# Patient Record
Sex: FEMALE | Race: WHITE | NOT HISPANIC OR LATINO | Employment: UNEMPLOYED | URBAN - METROPOLITAN AREA
[De-identification: names, ages, dates, MRNs, and addresses within clinical notes are randomized per-mention and may not be internally consistent; named-entity substitution may affect disease eponyms.]

---

## 2017-01-16 DIAGNOSIS — Z01.812 PRE-PROCEDURAL LABORATORY EXAMINATION: ICD-10-CM

## 2017-01-16 LAB
ANION GAP SERPL CALC-SCNC: 8 MMOL/L (ref 0–11.9)
BASOPHILS # BLD AUTO: 0.6 % (ref 0–1.8)
BASOPHILS # BLD: 0.04 K/UL (ref 0–0.12)
BUN SERPL-MCNC: 19 MG/DL (ref 8–22)
CALCIUM SERPL-MCNC: 9.7 MG/DL (ref 8.5–10.5)
CHLORIDE SERPL-SCNC: 102 MMOL/L (ref 96–112)
CO2 SERPL-SCNC: 25 MMOL/L (ref 20–33)
CREAT SERPL-MCNC: 0.7 MG/DL (ref 0.5–1.4)
EOSINOPHIL # BLD AUTO: 0.09 K/UL (ref 0–0.51)
EOSINOPHIL NFR BLD: 1.4 % (ref 0–6.9)
ERYTHROCYTE [DISTWIDTH] IN BLOOD BY AUTOMATED COUNT: 39.8 FL (ref 35.9–50)
GFR SERPL CREATININE-BSD FRML MDRD: >60 ML/MIN/1.73 M 2
GLUCOSE SERPL-MCNC: 105 MG/DL (ref 65–99)
HCT VFR BLD AUTO: 42 % (ref 37–47)
HGB BLD-MCNC: 14.6 G/DL (ref 12–16)
IMM GRANULOCYTES # BLD AUTO: 0.02 K/UL (ref 0–0.11)
IMM GRANULOCYTES NFR BLD AUTO: 0.3 % (ref 0–0.9)
LYMPHOCYTES # BLD AUTO: 1.99 K/UL (ref 1–4.8)
LYMPHOCYTES NFR BLD: 30.6 % (ref 22–41)
MCH RBC QN AUTO: 31.1 PG (ref 27–33)
MCHC RBC AUTO-ENTMCNC: 34.8 G/DL (ref 33.6–35)
MCV RBC AUTO: 89.4 FL (ref 81.4–97.8)
MONOCYTES # BLD AUTO: 0.63 K/UL (ref 0–0.85)
MONOCYTES NFR BLD AUTO: 9.7 % (ref 0–13.4)
NEUTROPHILS # BLD AUTO: 3.74 K/UL (ref 2–7.15)
NEUTROPHILS NFR BLD: 57.4 % (ref 44–72)
NRBC # BLD AUTO: 0 K/UL
NRBC BLD AUTO-RTO: 0 /100 WBC
PLATELET # BLD AUTO: 263 K/UL (ref 164–446)
PMV BLD AUTO: 9.3 FL (ref 9–12.9)
POTASSIUM SERPL-SCNC: 3.7 MMOL/L (ref 3.6–5.5)
RBC # BLD AUTO: 4.7 M/UL (ref 4.2–5.4)
SODIUM SERPL-SCNC: 135 MMOL/L (ref 135–145)
WBC # BLD AUTO: 6.5 K/UL (ref 4.8–10.8)

## 2017-01-16 PROCEDURE — 85025 COMPLETE CBC W/AUTO DIFF WBC: CPT

## 2017-01-16 PROCEDURE — 80048 BASIC METABOLIC PNL TOTAL CA: CPT

## 2017-01-16 PROCEDURE — 36415 COLL VENOUS BLD VENIPUNCTURE: CPT

## 2017-01-21 ENCOUNTER — HOSPITAL ENCOUNTER (OUTPATIENT)
Facility: MEDICAL CENTER | Age: 27
End: 2017-01-22
Attending: SPECIALIST | Admitting: SPECIALIST
Payer: MEDICAID

## 2017-01-21 PROBLEM — N94.10 FEMALE DYSPAREUNIA: Status: ACTIVE | Noted: 2017-01-21

## 2017-01-21 PROBLEM — N87.1 MODERATE DYSPLASIA OF CERVIX: Status: ACTIVE | Noted: 2017-01-21

## 2017-01-21 LAB
HCG UR QL: NEGATIVE
SP GR UR REFRACTOMETRY: 1.02

## 2017-01-21 PROCEDURE — 502703 HCHG DEVICE, LIGASURE V SEALER: Performed by: SPECIALIST

## 2017-01-21 PROCEDURE — A4314 CATH W/DRAINAGE 2-WAY LATEX: HCPCS | Performed by: SPECIALIST

## 2017-01-21 PROCEDURE — 81025 URINE PREGNANCY TEST: CPT

## 2017-01-21 PROCEDURE — 501838 HCHG SUTURE GENERAL: Performed by: SPECIALIST

## 2017-01-21 PROCEDURE — 502240 HCHG MISC OR SUPPLY RC 0272: Performed by: SPECIALIST

## 2017-01-21 PROCEDURE — A9270 NON-COVERED ITEM OR SERVICE: HCPCS | Performed by: SPECIALIST

## 2017-01-21 PROCEDURE — 500886 HCHG PACK, LAPAROSCOPY: Performed by: SPECIALIST

## 2017-01-21 PROCEDURE — 700111 HCHG RX REV CODE 636 W/ 250 OVERRIDE (IP)

## 2017-01-21 PROCEDURE — A4606 OXYGEN PROBE USED W OXIMETER: HCPCS | Performed by: SPECIALIST

## 2017-01-21 PROCEDURE — 700102 HCHG RX REV CODE 250 W/ 637 OVERRIDE(OP)

## 2017-01-21 PROCEDURE — 500800 HCHG LAPAROSCOPIC J/L HOOK: Performed by: SPECIALIST

## 2017-01-21 PROCEDURE — 160039 HCHG SURGERY MINUTES - EA ADDL 1 MIN LEVEL 3: Performed by: SPECIALIST

## 2017-01-21 PROCEDURE — 502594 HCHG SCISSOR HANDLE, HARMONIC ACE: Performed by: SPECIALIST

## 2017-01-21 PROCEDURE — 160036 HCHG PACU - EA ADDL 30 MINS PHASE I: Performed by: SPECIALIST

## 2017-01-21 PROCEDURE — 500123 HCHG BOVIE, CONTROL W/BLADE: Performed by: SPECIALIST

## 2017-01-21 PROCEDURE — 110371 HCHG SHELL REV 272: Performed by: SPECIALIST

## 2017-01-21 PROCEDURE — 160035 HCHG PACU - 1ST 60 MINS PHASE I: Performed by: SPECIALIST

## 2017-01-21 PROCEDURE — 501577 HCHG TROCAR, STEP 11MM: Performed by: SPECIALIST

## 2017-01-21 PROCEDURE — 96374 THER/PROPH/DIAG INJ IV PUSH: CPT | Mod: XU

## 2017-01-21 PROCEDURE — 96376 TX/PRO/DX INJ SAME DRUG ADON: CPT | Mod: XU

## 2017-01-21 PROCEDURE — 700102 HCHG RX REV CODE 250 W/ 637 OVERRIDE(OP): Performed by: SPECIALIST

## 2017-01-21 PROCEDURE — 160048 HCHG OR STATISTICAL LEVEL 1-5: Performed by: SPECIALIST

## 2017-01-21 PROCEDURE — 160009 HCHG ANES TIME/MIN: Performed by: SPECIALIST

## 2017-01-21 PROCEDURE — A9270 NON-COVERED ITEM OR SERVICE: HCPCS

## 2017-01-21 PROCEDURE — 500854 HCHG NEEDLE, INSUFFLATION FOR STEP: Performed by: SPECIALIST

## 2017-01-21 PROCEDURE — G0378 HOSPITAL OBSERVATION PER HR: HCPCS

## 2017-01-21 PROCEDURE — 502679 HCHG MANIPULATOR, UTRN DAVINCI: Performed by: SPECIALIST

## 2017-01-21 PROCEDURE — 700111 HCHG RX REV CODE 636 W/ 250 OVERRIDE (IP): Performed by: SPECIALIST

## 2017-01-21 PROCEDURE — 110382 HCHG SHELL REV 271: Performed by: SPECIALIST

## 2017-01-21 PROCEDURE — 88309 TISSUE EXAM BY PATHOLOGIST: CPT

## 2017-01-21 PROCEDURE — 160028 HCHG SURGERY MINUTES - 1ST 30 MINS LEVEL 3: Performed by: SPECIALIST

## 2017-01-21 PROCEDURE — 160002 HCHG RECOVERY MINUTES (STAT): Performed by: SPECIALIST

## 2017-01-21 PROCEDURE — 700101 HCHG RX REV CODE 250

## 2017-01-21 PROCEDURE — 501330 HCHG SET, CYSTO IRRIG TUBING: Performed by: SPECIALIST

## 2017-01-21 RX ORDER — SODIUM CHLORIDE, SODIUM LACTATE, POTASSIUM CHLORIDE, CALCIUM CHLORIDE 600; 310; 30; 20 MG/100ML; MG/100ML; MG/100ML; MG/100ML
INJECTION, SOLUTION INTRAVENOUS CONTINUOUS
Status: DISCONTINUED | OUTPATIENT
Start: 2017-01-21 | End: 2017-01-22 | Stop reason: HOSPADM

## 2017-01-21 RX ORDER — OXYCODONE HCL 5 MG/5 ML
SOLUTION, ORAL ORAL
Status: COMPLETED
Start: 2017-01-21 | End: 2017-01-21

## 2017-01-21 RX ORDER — MORPHINE SULFATE 10 MG/ML
5 INJECTION, SOLUTION INTRAMUSCULAR; INTRAVENOUS
Status: DISCONTINUED | OUTPATIENT
Start: 2017-01-21 | End: 2017-01-22 | Stop reason: HOSPADM

## 2017-01-21 RX ORDER — ZOLPIDEM TARTRATE 5 MG/1
5 TABLET ORAL NIGHTLY PRN
Status: DISCONTINUED | OUTPATIENT
Start: 2017-01-21 | End: 2017-01-22 | Stop reason: HOSPADM

## 2017-01-21 RX ORDER — OXYCODONE HCL 10 MG/1
TABLET, FILM COATED, EXTENDED RELEASE ORAL
Status: COMPLETED
Start: 2017-01-21 | End: 2017-01-21

## 2017-01-21 RX ORDER — IBUPROFEN 800 MG/1
800 TABLET ORAL EVERY 6 HOURS PRN
Status: DISCONTINUED | OUTPATIENT
Start: 2017-01-21 | End: 2017-01-22 | Stop reason: HOSPADM

## 2017-01-21 RX ORDER — ONDANSETRON 2 MG/ML
4 INJECTION INTRAMUSCULAR; INTRAVENOUS EVERY 6 HOURS PRN
Status: DISCONTINUED | OUTPATIENT
Start: 2017-01-21 | End: 2017-01-22 | Stop reason: HOSPADM

## 2017-01-21 RX ORDER — OXYCODONE AND ACETAMINOPHEN 7.5; 325 MG/1; MG/1
1 TABLET ORAL
Status: DISCONTINUED | OUTPATIENT
Start: 2017-01-21 | End: 2017-01-22

## 2017-01-21 RX ORDER — ACETAMINOPHEN 325 MG/1
650 TABLET ORAL ONCE
Status: DISCONTINUED | OUTPATIENT
Start: 2017-01-21 | End: 2017-01-22 | Stop reason: HOSPADM

## 2017-01-21 RX ORDER — OXYCODONE AND ACETAMINOPHEN 7.5; 325 MG/1; MG/1
1 TABLET ORAL EVERY 4 HOURS PRN
Status: DISCONTINUED | OUTPATIENT
Start: 2017-01-21 | End: 2017-01-21

## 2017-01-21 RX ORDER — SODIUM CHLORIDE, SODIUM LACTATE, POTASSIUM CHLORIDE, CALCIUM CHLORIDE 600; 310; 30; 20 MG/100ML; MG/100ML; MG/100ML; MG/100ML
1000 INJECTION, SOLUTION INTRAVENOUS
Status: DISCONTINUED | OUTPATIENT
Start: 2017-01-21 | End: 2017-01-22 | Stop reason: HOSPADM

## 2017-01-21 RX ORDER — SIMETHICONE 80 MG
80 TABLET,CHEWABLE ORAL EVERY 8 HOURS PRN
Status: DISCONTINUED | OUTPATIENT
Start: 2017-01-21 | End: 2017-01-22 | Stop reason: HOSPADM

## 2017-01-21 RX ADMIN — HYDROMORPHONE HYDROCHLORIDE 0.5 MG: 1 INJECTION, SOLUTION INTRAMUSCULAR; INTRAVENOUS; SUBCUTANEOUS at 15:00

## 2017-01-21 RX ADMIN — OXYCODONE HYDROCHLORIDE 10 MG: 5 SOLUTION ORAL at 14:00

## 2017-01-21 RX ADMIN — FENTANYL CITRATE 50 MCG: 50 INJECTION, SOLUTION INTRAMUSCULAR; INTRAVENOUS at 14:00

## 2017-01-21 RX ADMIN — HYDROMORPHONE HYDROCHLORIDE 0.3 MG: 1 INJECTION, SOLUTION INTRAMUSCULAR; INTRAVENOUS; SUBCUTANEOUS at 14:35

## 2017-01-21 RX ADMIN — MORPHINE SULFATE 5 MG: 10 INJECTION INTRAVENOUS at 19:06

## 2017-01-21 RX ADMIN — MORPHINE SULFATE 5 MG: 10 INJECTION INTRAVENOUS at 22:11

## 2017-01-21 RX ADMIN — FENTANYL CITRATE 50 MCG: 50 INJECTION, SOLUTION INTRAMUSCULAR; INTRAVENOUS at 14:10

## 2017-01-21 RX ADMIN — OXYCODONE AND ACETAMINOPHEN 1 TABLET: 7.5; 325 TABLET ORAL at 20:44

## 2017-01-21 RX ADMIN — OXYCODONE AND ACETAMINOPHEN 1 TABLET: 7.5; 325 TABLET ORAL at 16:06

## 2017-01-21 RX ADMIN — OXYCODONE AND ACETAMINOPHEN 1 TABLET: 7.5; 325 TABLET ORAL at 23:49

## 2017-01-21 RX ADMIN — IBUPROFEN 800 MG: 800 TABLET, FILM COATED ORAL at 16:06

## 2017-01-21 ASSESSMENT — PAIN SCALES - GENERAL
PAINLEVEL_OUTOF10: 7
PAINLEVEL_OUTOF10: 6
PAINLEVEL_OUTOF10: 5
PAINLEVEL_OUTOF10: 7
PAINLEVEL_OUTOF10: 6
PAINLEVEL_OUTOF10: 5
PAINLEVEL_OUTOF10: 5
PAINLEVEL_OUTOF10: 6

## 2017-01-21 ASSESSMENT — LIFESTYLE VARIABLES
HAVE YOU EVER FELT YOU SHOULD CUT DOWN ON YOUR DRINKING: NO
HOW MANY TIMES IN THE PAST YEAR HAVE YOU HAD 5 OR MORE DRINKS IN A DAY: 0
AVERAGE NUMBER OF DAYS PER WEEK YOU HAVE A DRINK CONTAINING ALCOHOL: 4
ALCOHOL_USE: YES
ON A TYPICAL DAY WHEN YOU DRINK ALCOHOL HOW MANY DRINKS DO YOU HAVE: 3
TOTAL SCORE: 0
TOTAL SCORE: 0
EVER FELT BAD OR GUILTY ABOUT YOUR DRINKING: NO
EVER_SMOKED: YES
EVER HAD A DRINK FIRST THING IN THE MORNING TO STEADY YOUR NERVES TO GET RID OF A HANGOVER: NO
CONSUMPTION TOTAL: POSITIVE
HAVE PEOPLE ANNOYED YOU BY CRITICIZING YOUR DRINKING: NO
TOTAL SCORE: 0

## 2017-01-21 NOTE — IP AVS SNAPSHOT
" <p align=\"LEFT\"><IMG SRC=\"//[email or web address]$/[email or web address]\" alt=\"Image\" WIDTH=\"50%\" HEIGHT=\"200\" BORDER=\"\"></p>                   Name:Mary Anne Valenzuela  Medical Record Number:[id number]  CSN: [id number]    YOB: 1990   Age: 26 y.o.  Sex: female  HT:1.702 m (5' 7\") WT: 80.1 kg (176 lb 9.4 oz)          Admit Date: 1/21/2017     Discharge Date:   Today's Date: 1/22/2017  Attending Doctor:  Santo Walsh M.D.                  Allergies:  Nkda          Follow-up Information     1. Follow up with Santo Walsh M.D.. Schedule an appointment as soon as possible for a visit in 1 week.    Specialty:  OB/Gyn    Contact information    [address] [place] [address] [address]  [facility] [address]  [phone]           Medication List      Take these Medications        Instructions    * ibuprofen 200 MG Tabs   Commonly known as:  MOTRIN    Take 400 mg by mouth every 6 hours as needed for Headache.   Dose:  400 mg       * ibuprofen 800 MG Tabs   Commonly known as:  MOTRIN    Take 1 Tab by mouth every 8 hours as needed.   Dose:  800 mg       * oxycodone-acetaminophen 5-325 MG Tabs   What changed:  Another medication with the same name was added. Make sure you understand how and when to take each.   Commonly known as:  PERCOCET    Take 1-2 Tabs by mouth every 3 hours as needed for Moderate Pain ((Pain Scale 4-6)).   Dose:  1-2 Tab       * oxycodone-acetaminophen  MG Tabs   What changed:  You were already taking a medication with the same name, and this prescription was added. Make sure you understand how and when to take each.   Commonly known as:  PERCOCET-10    Take 1-2 Tabs by mouth every four hours as needed for Moderate Pain.   Dose:  1-2 Tab       * Notice:  This list has 4 medication(s) that are the same as other medications prescribed for you. Read the directions carefully, and ask your doctor or other care provider to review them with you.      "

## 2017-01-21 NOTE — OP REPORT
DATE OF SERVICE:  01/21/2017    PREOPERATIVE DIAGNOSES:  1.  Recurrent cervical dysplasia.  2.  Pelvic pain.  3.  Dyspareunia.    POSTOPERATIVE DIAGNOSES:  1.  Recurrent cervical dysplasia.  2.  Pelvic pain.  3.  Dyspareunia.  4.  Intraperitoneal adhesions including an adhesion of the omentum to the   midline of the anterior abdominal wall just inferior to the umbilicus and also   an adhesion of the surface of the right ovary to the right side of the pelvis   limiting mobility of the right ovary.    PROCEDURES:  1.  Total laparoscopic hysterectomy.  2.  Laparoscopic lysis of adhesions including lysis of adhesions of the   omentum to the anterior abdominal wall and lysis of adhesions involving the   right ovary.    SURGEON:  Santo Walsh M.D.    ASSISTANT:  Roxana Contreras MD    ANESTHESIA:  General endotracheal tube anesthesia.    ANESTHESIOLOGIST:  Alejandro Gamino MD    FINDINGS:  At laparoscopy, both fallopian tubes are found to be absent.  There   is an adhesion attaching the right ovary to the right side of the pelvis   limiting mobility of the right ovary.  There was also an adhesion of the   omentum to the midline of the anterior abdominal wall just inferior to the   umbilicus.  Other than for the adhesions involving the right ovary, both   ovaries appeared to be normal and both ovarian fossa appeared to be normal.    At cystoscopy, bilateral ureteral jets of urine are seen indicating that both   ureters are patent.  Also, at cystoscopy, the dome of the bladder is seen and   appears normal.    SPECIMENS:  Uterus.    COMPLICATIONS:  None.    ESTIMATED BLOOD LOSS:  Approximately 200 mL.    DESCRIPTION OF PROCEDURE:  After the appropriate consents have been obtained,   the patient was taken to the operating room and given general anesthesia.  She   is prepped and draped in the dorsal lithotomy position and a Azar catheter   is noted to be in place and draining urine.  A speculum exam is performed and    reveals that there are no vulvar, vaginal or cervical lesions.  The cervix is   large and multiparous.  A single tooth tenaculum was applied to the anterior   aspect of the cervix.  The cervix is dilated with Hanks dilators.  A Vicryl   suture was placed in the anterior aspect of the cervix.  A second Vicryl   suture was placed in the cervix, this one at the 6 o'clock position of the   cervix, again in the usual fashion.  A large V-Care uterine manipulator was   advanced through the endocervical canal into the intrauterine cavity and the   balloon at the tip of the V-Care uterine manipulator was inflated with several   milliliters of water.  The single tooth tenaculum was removed from the   cervix.  The twins of the Vicryl suture at the 12 o'clock position of the   cervix were placed through 2 adjacent holes on one side of the inner cup of   the V-Care uterine manipulator.  The 2 ends of the Vicryl suture at the 6   o'clock position of the cervix were placed through 2 adjacent holes on the   opposite side of the inner cup of the V-Care uterine manipulator.  The   speculum was then removed.  The inner cup of the V-Care uterine manipulator   was then advanced along the shaft of the V-Care uterine manipulator into the   vaginal vault and pressed against the cervix.  The 2 ends of the Vicryl suture   at the 12 o'clock position of the cervix were tied several times and the   excess Vicryl suture along with the needle are cut off And the needle was set   aside.  The 2 ends of the Vicryl suture at the 6 o'clock position were tied   several times and excess Vicryl suture was cut off and the needle was also set   aside.  The blue portion of the V-Care uterine manipulator was advanced along   the shaft of the V-Care uterine manipulator into the vaginal vault and   secured in the usual fashion.  The 's gloves were then changed.    Attention was then directed to the abdomen where a small (approximately 1 cm)    horizontal infraumbilical incision was made with a scalpel.  A Veress needle   was advanced through this incision into the peritoneal cavity and proper   placement in the peritoneal cavity was verified with palmar hanging drop   technique.  The peritoneal cavity was then insufflated with approximately 2-3   liters of carbon dioxide gas.  The Veress needle was removed and a 10-12 mm   port was introduced through the infraumbilical incision into the peritoneal   cavity utilizing the VersaStep trocar system.  The central portion of this   port was removed and the 10 mm 0-degree laparoscope was inserted through the   remaining sleeve and proper entry in the peritoneal cavity was verified   visually with laparoscope.  The adhesion of the omentum to the midline of the   anterior abdominal wall just inferior to the umbilicus was seen and a picture   was taken of this.  A 10-12 mm port was placed in the left lower quadrant   under direct laparoscopic visualization utilizing the VersaStep trocar system.    Next, a suprapubic port was placed under direct laparoscopic visualization   utilizing the VersaStep trocar system.  Adhesions of the small bowel to the   right side of the abdomen closed to where the right lower quadrant port would   be placed are seen and these are meticulously lysed with a combination of the   Harmonic scalpel and regular laparoscopic scissors and the bowel was dissected   away from this area of the right lower quadrant nicely with very little to no   blood loss.  Then, a right lower quadrant port was placed in the right lower   quadrant under direct laparoscopic visualization utilizing the VersaStep   trocar system.  The liver is seen and appears normal and a picture was taken   of the liver.  It should be noted that at this time, the 10 mm 0-degree   laparoscope was exchanged for the 10 mm 30-degree laparoscope.  It also noted   that during this procedure, the 10 mm 30-degree laparoscope was placed  not   only through the infraumbilical port, but at times through the right lower   quadrant port and at another times through the left lower quadrant port and   then at other times even through the suprapubic port.  The uterus was   mobilized and findings are as noted above.  By this time, the small adhesions   of the omentum to the midline of the anterior abdominal wall, inferior to the   umbilicus was easily lysed with Harmonic scalpel.  Attention was directed to   the right side of the uterus.  The LigaSure [place] 10 mm bipolar cutting   forceps was used to thoroughly cauterize, seal, and cut the proximal right   round ligament.  The LigaSure [place] 10 mm bipolar cutting forceps was used to   thoroughly cauterize, seal, and cut the right proper ovarian ligament.  The   tissue in between the incision in the right proximal round ligament and the   incision in the right proper ovarian ligament thoroughly cauterized, sealed,   and cut with LigaSure [place] 10 mm bipolar cutting forceps.  The most proximal   portions of the right broad ligament including both anterior and posterior   leaves and including ipsilateral ascending branches of the uterine vessels   were serially thoroughly cauterized, sealed, and cut with LigaSure [place] 10 mm   bipolar cutting forceps.  Next, attention was directed to the left side of   the uterus.  The left proximal round ligament was thoroughly cauterized,   sealed, and cut with LigaSure [place] 10 mm bipolar cutting forceps.  The left   proper ovarian ligament was thoroughly cauterized, sealed, and cut with   LigaSure [place] 10 mm bipolar cutting forceps.  The tissue in between the   incision in the left proximal round ligament and the incision in the left   proper ovarian ligament was thoroughly cauterized, sealed, and cut with   LigaSure [place] 10 mm bipolar cutting forceps.  The most proximal portions of   the left broad ligament including both anterior and posterior leaves and   including  ipsilateral ascending branches, the uterine vessels were serially   thoroughly cauterized, sealed, and cut with the LigaSure [place] 10 mm bipolar   cutting forceps down to around the level of the junction between the uterine   corpus and uterine cervix.  Remnants of anterior and posterior leaves of left   broad ligament were dissected out bluntly and uterine vessels on the left were   nicely exposed and then once nicely exposed thoroughly cauterized and sealed   with the LigaSure [place] instrument and cut with the Harmonic scalpel.    Creation of the bladder flap was begun on the left in the usual fashion by   incising the visceral peritoneum covering the anterior lower uterine segment   with the Harmonic scalpel, starting from the left and going medially and the   bladder was partially dissected away.  Attention was then directed to the   right side of the uterus.  Remnants of anterior and posterior leaves of the   right broad ligament were dissected out bluntly and uterine vessels on the   right are exposed and thoroughly cauterized and sealed with LigaSure [place]   instrument and cut with the Harmonic scalpel.  The bladder flap was continued   on the right in the usual fashion using the Harmonic scalpel and meticulous   blunt dissection and then the bladder was dissected away nicely and thoroughly   anteriorly.  Uterosacral cardinal ligament complexes are bilaterally incised   at their insertions in the uterus using combination of the Harmonic scalpel   and the LigaSure instrument.  At this time, the fundus of the uterus was found   to be cyanotic indicating ischemia of the uterus.  Also, at this time, the   junction between the vagina and cervix was nicely appreciated, thanks to the   inner lip of the inner cup of the uterine manipulator and the tissue overlying   this inner lip of the inner cup of the V-Care uterine manipulator was   circumferentially incised with combination both monopolar cautery with the    monopolar cautery instrument (J hook) and also the Harmonic scalpel.  Once   this process was completed, the uterus has no more attachments to the rest of   the body and then was delivered into the vaginal vault and left in the vaginal   vault at this time, so as to maintain pneumoperitoneum.  The vaginal cuff was   then closed.  First, bilateral angle sutures are placed.  All sutures were   placed using intracorporeal suturing technique and extracorporeal knot tying   technique.  The suture in the right angle of the vaginal cuff is placed and   the two ends of the suture were brought out through the suprapubic port and   the needles cut off and set aside.  The 2 ends are brought out through the   right lower quadrant port and attention was placed on the sutures to create   traction on the right angle of the vaginal cuff so as to enhance   visualization, so as to facilitate closure of the vaginal cuff and attention   was maintained by clamping the sutures at the right lower quadrant port.  The   same was then performed on the left.  Several interrupted simple sutures were   placed in between these 2 along the entire length of the vaginal cuff.  The   vaginal cuff was reapproximated nicely in this way and the patient was taken.    Throughout the procedure, gauze sponges were placed in the pelvis, in the   peritoneal cavity in the pelvis, and no gauze sponge was placed prior to the   previous gauze sponge being removed.  The final gauze sponges removed.  Both   ovaries are examined and there is an adhesion underneath the right ovary   confining the right ovary and this was lysed with the Harmonic scalpel.  The   patient was taken out of Trendelenburg position.  Last gauze sponge had been   removed.  The Azar catheter bulb was deflated and the Azar catheter was   removed and the cystoscope was placed through the urethra into the bladder and   cystoscopy was performed.  The right ureteral ostia was clearly  identified.    Next, a vigorous jet of urine was seen coming from the right ureteral ostia   indicating that the right ureter is patent.  Next, the left ureteral ostia was   clearly identified.  Next, a vigorous jet of urine was seen coming from the   left ureteral ostia indicating that the left ureter was patent.  The dome of   the bladder was examined and noted to be normal.  The cystoscope was removed.    The Azar catheter is replaced.  By this time, the uterus had been removed   from the vaginal vault and submitted as a specimen.  The 's gloves   were changed.  The ports were removed and air is allowed to escape the   peritoneal cavity.  The fascia underneath each of the incision was identified   with the use of S retractors and reapproximated with 1 interrupted simple   suture of Vicryl (one for each incision).  All skin incisions were   reapproximated with placement of multiple interrupted buried sutures of 4-0   Monocryl placed in the dermis.  The procedure was terminated.  Final lap and   needle counts reported to be correct x2 at the end of procedure.  Patient   tolerated the procedure well and sent to postanesthesia recovery in stable   condition.       ____________________________________     SERGIO MONTALVO MD    MED / NTS    DD:  01/21/2017 14:10:31  DT:  01/21/2017 15:28:11    D#:  [id number]  Job#:  [id number]    cc: CALE ZAMORA MD, HIMANSHU HERNANDEZ MD

## 2017-01-21 NOTE — IP AVS SNAPSHOT
Truly Accomplished Access Code: [email or web address]  Expires: 2/21/2017 12:27 PM    Your email address is not on file at [email or web address].  Email Addresses are required for you to sign up for [employer]plished, please contact [phone] to verify your personal information and to provide your email address prior to attempting to register for [employer].    Mary Anne Valenzuela  [address] [facility] [place] [address]    Truly Accomplished  A secure, online tool to manage your health information     [email or web address]’s Truly Accomplished® is a secure, online tool that connects you to your personalized health information from the privacy of your home -- day or night - making it very easy for you to manage your healthcare. Once the activation process is completed, you can even access your medical information using the Truly Accomplished pramod, which is available for free in the Apple Pramod store or Google Play store.     To learn more about Truly Accomplished, visit [email or web address]    There are two levels of access available (as shown below):   My Chart Features  [facility] Primary Care Doctor [facility]  Specialists [facility]  Urgent  Care Non-[facility] Primary Care Doctor   Email your healthcare team securely and privately 24/7 X X X    Manage appointments: schedule your next appointment; view details of past/upcoming appointments X      Request prescription refills. X      View recent personal medical records, including lab and immunizations X X X X   View health record, including health history, allergies, medications X X X X   Read reports about your outpatient visits, procedures, consult and ER notes X X X X   See your discharge summary, which is a recap of your hospital and/or ER visit that includes your diagnosis, lab results, and care plan X X  X     How to register for Eyev[employer]:  Once your e-mail address has been verified, follow the following steps to sign up for [employer].     1. Go to  [email or web address]  2. Click on the Sign Up Now box, which takes you to the New Member Sign Up page. You  will need to provide the following information:  a. Enter your [employer] Access Code exactly as it appears at the top of this page. (You will not need to use this code after you’ve completed the sign-up process. If you do not sign up before the expiration date, you must request a new code.)   b. Enter your date of birth.   c. Enter your home email address.   d. Click Submit, and follow the next screen’s instructions.  3. Create a X[employer]St ID. This will be your [employer] login ID and cannot be changed, so think of one that is secure and easy to remember.  4. Create a [employer] password. You can change your password at any time.  5. Enter your Password Reset Question and Answer. This can be used at a later time if you forget your password.   6. Enter your e-mail address. This allows you to receive e-mail notifications when new information is available in [employer].  7. Click Sign Up. You can now view your health information.    For assistance activating your [employer] account, call [phone]

## 2017-01-21 NOTE — H&P
Mary Anne Valenzuela          YOB: 1990  Date of today's surgery: Saturday, January 21, 2017  Facility: [facility]    ID: The patient is a very pleasant 26-year-old multipara (para-2 and she has had 2 previous vaginal deliveries).    Chief Complaint: The patient complains of pelvic pain and dyspareunia.    History of Present Illness: The patient has had previous laparoscopy with laparoscopic lysis of adhesions and laparoscopic bilateral salpingectomy and hysteroscopy with hydrothermal endometrial ablation.  She continues to have pelvic pain and dyspareunia.  She also was previously treated for cervical dysplasia and now has recurrent cervical dysplasia (moderate cervical dysplasia) despite previous treatment for cervical dysplasia.  She is scheduled today to have a total laparoscopic hysterectomy followed by cystoscopy.  I have explained to her and discussed with her in detail and at length what total laparoscopic hysterectomy followed by cystoscopy is and what total laparoscopic hysterectomy followed by cystoscopy involves and I have discussed with her and explain to her in detail and at length the risks and benefits and alternatives of total laparoscopic hysterectomy followed by cystoscopy and after our discussions and after answering her question she told me that she very much wishes for us to proceed with total laparoscopic hysterectomy followed by cystoscopy.    Past Medical History: The patient has migraine headaches.  Otherwise she has no other medical illnesses.    Past Surgical History: The patient has had a laparoscopy with laparoscopic bilateral salpingectomy and hysteroscopy with hydrothermal endometrial ablation.  She says that she had an appendectomy when she was 11 years old.    Medications: The patient says that she takes no medications except for medications for her migraine headaches.    Allergies: The patient says that she has no known drug allergies.    Social  History: The patient smokes about one and a half packs of cigarettes per day.  She occasionally consumes alcoholic beverages.  She denies using recreational drugs.    Review of Systems  General: The patient denies any fevers, chills, sweats.  Pulmonary: The patient denies any coughing, wheezing, chest pain, shortness of breath.  Cardiovascular: The patient denies any palpitations, dyspnea, chest pain.  Gastrointestinal: The patient denies any nausea, vomiting, diarrhea, constipation, hematochezia, melena, history of hepatitis, history of jaundice.  Genitourinary: The patient complains of pelvic pain, especially after she has intercourse.  She also has dyspareunia.  Musculoskeletal: The patient denies any arthralgias or myalgias.   Neurological: The patient says that her last migraine headache was last week.  She denies any syncope or seizures.      Physical Exam:   Vital Signs: The patient's vital signs are stable and she is afebrile.  General: The patient appears well developed and well nourished and relaxed and alert and comfortable and in no apparent distress.    HEENT :  Normo-cephalic, atraumatic, pupils equal, round, reactive to light and accommodation, extra ocular motions intact, pharynx clear; there is no thyromegaly. There is no cervical lymphadenopathy.  Chest: Heart regular rate and rhythm, with no murmurs or rubs or gallops; the lungs are clear to auscultation bilaterally.  Abdomen: The abdomen is soft and flat and non-tender and non-distended. There is no hepatomegaly. There is no splenomegaly.  There is a right lower quadrant scar.  Pelvic: Speculum exam reveals that there are no vulvar or vaginal or cervical lesions and that there is no cervical or vaginal discharge.  The vulva and vaginal mucosa are well estrogenized.  Bimanual vaginal exam reveals no evidence of uterine enlargement and no evidence of cervical motion tenderness and no evidence of any tenderness to palpation of the uterine corpus and  no evidence of any adnexal masses or tenderness either on the right or the left.  Extremities: No clubbing or cyanosis or edema.   Neurological: non-focal.     Assessment:   Recurrent cervical dysplasia  Pelvic pain  Dyspareunia    Plan:   We will proceed today with total laparoscopic hysterectomy followed by cystoscopy.  Please see above.            ________________________  Santo Walsh M.D.

## 2017-01-21 NOTE — OR SURGEON
Immediate Post-Operative Note      PreOp Diagnosis:   Recurrent cervical dysplasia  Pelvic pain  Dyspareunia    PostOp Diagnosis:   Recurrent cervical dysplasia  Pelvic pain  Dyspareunia    Procedure(s):  Total laparoscopic hysterectomy  cystoscopy    Surgeon(s):  MARCELLO Granados M.D.    Anesthesiologist/Type of Anesthesia:  Anesthesiologist: Alejandro Gamino M.D./General    Surgical Staff:  Circulator: Arnie Gann R.N.; Delvin Jackson R.N.  Relief Scrub: Jeremias Leyva  Scrub Person: Lucila Everett    Specimen:   Uterus    Estimated Blood Loss:   About 200 cc's     Findings:   Both Fallopian tubes are absent  There is an adhesion attaching the right ovary to the right side of the pelvis.   Otherwise both ovaries are normal and both ovarian fossae are otherwise normal    Complications:   None.         1/21/2017 1:45 PM Santo Walsh

## 2017-01-21 NOTE — IP AVS SNAPSHOT
" After Visit Summary                                                                                                                  Name:Mary Anne Valenzuela  Medical Record Number:[id number]  CSN: [id number]    YOB: 1990   Age: 26 y.o.  Sex: female  HT:1.702 m (5' 7\") WT: 80.1 kg (176 lb 9.4 oz)          Admit Date: 1/21/2017     Discharge Date:   Today's Date: 1/22/2017  Attending Doctor:  Santo Walsh M.D.                  Allergies:  Nkda            Discharge Instructions       Discharge Instructions    Discharged to home by car with relative. Discharged via wheelchair, hospital escort: Yes.  Special equipment needed: Not Applicable    Be sure to schedule a follow-up appointment with your primary care doctor or any specialists as instructed.     Discharge Plan:   Smoking Cessation Offered: Patient Refused  Influenza Vaccine Indication: Patient Refuses    I understand that a diet low in cholesterol, fat, and sodium is recommended for good health. Unless I have been given specific instructions below for another diet, I accept this instruction as my diet prescription.   Other diet: regular    Special Instructions: {BODY SYSTEMS:71700}    · Is patient discharged on Warfarin / Coumadin?   {WARFARIN YES/NO?:648162}    · Is patient Post Blood Transfusion?  {TRANSFUSION YES/NO?:28576}    Depression / Suicide Risk    As you are discharged from this [facility] facility, it is important to learn how to keep safe from harming yourself.    Recognize the warning signs:  · Abrupt changes in personality, positive or negative- including increase in energy   · Giving away possessions  · Change in eating patterns- significant weight changes-  positive or negative  · Change in sleeping patterns- unable to sleep or sleeping all the time   · Unwillingness or inability to communicate  · Depression  · Unusual sadness, discouragement and loneliness  · Talk of wanting to die  · Neglect of personal " appearance   · Rebelliousness- reckless behavior  · Withdrawal from people/activities they love  · Confusion- inability to concentrate     If you or a loved one observes any of these behaviors or has concerns about self-harm, here's what you can do:  · Talk about it- your feelings and reasons for harming yourself  · Remove any means that you might use to hurt yourself (examples: pills, rope, extension cords, firearm)  · Get professional help from the community (Mental Health, Substance Abuse, psychological counseling)  · Do not be alone:Call your Safe Contact- someone whom you trust who will be there for you.  · Call your local CRISIS HOTLINE 609-6303 or 723-533-1717  · Call your local Children's Mobile Crisis Response Team [place] (430) 650-3811 or www."[employer]  · Call the toll free National Suicide Prevention Hotlines   · National Suicide Prevention Lifeline 553-022-DOSX (7339)  · Blog[employer] Line Network 800-SUICIDE (044-3544)  Laparoscopically Assisted Vaginal Hysterectomy, Care After  Refer to this sheet in the next few weeks. These instructions provide you with information on caring for yourself after your procedure. Your health care provider may also give you more specific instructions. Your treatment has been planned according to current medical practices, but problems sometimes occur. Call your health care provider if you have any problems or questions after your procedure.  WHAT TO EXPECT AFTER THE PROCEDURE  After your procedure, it is typical to have the following:  · Abdominal pain. You will be given pain medicine to control it.  · Sore throat from the breathing tube that was inserted during surgery.  HOME CARE INSTRUCTIONS  · Only take over-the-counter or prescription medicines for pain, discomfort, or fever as directed by your health care provider.  · Do not take aspirin. It can cause bleeding.  · Do not drive when taking pain medicine.  · Follow your health care provider's advice  regarding diet, exercise, lifting, driving, and general activities.  · Resume your usual diet as directed and allowed.  · Get plenty of rest and sleep.  · Do not douche, use tampons, or have sexual intercourse for at least 6 weeks, or until your health care provider gives you permission.  · Change your bandages (dressings) as directed by your health care provider.  · Monitor your temperature and notify your health care provider of a fever.  · Take showers instead of baths for 2-3 weeks.  · Do not drink alcohol until your health care provider gives you permission.  · If you develop constipation, you may take a mild laxative with your health care provider's permission. Bran foods may help with constipation problems. Drinking enough fluids to keep your urine clear or pale yellow may help as well.  · Try to have someone home with you for 1-2 weeks to help around the house.  · Keep all of your follow-up appointments as directed by your health care provider.  SEEK MEDICAL CARE IF:   · You have swelling, redness, or increasing pain around your incision sites.  · You have pus coming from your incision.  · You notice a bad smell coming from your incision.  · Your incision breaks open.  · You feel dizzy or lightheaded.  · You have pain or bleeding when you urinate.  · You have persistent diarrhea.  · You have persistent nausea and vomiting.  · You have abnormal vaginal discharge.  · You have a rash.  · You have any type of abnormal reaction or develop an allergy to your medicine.  · You have poor pain control with your prescribed medicine.  SEEK IMMEDIATE MEDICAL CARE IF:   · You have a fever.  · You have severe abdominal pain.  · You have chest pain.  · You have shortness of breath.  · You faint.  · You have pain, swelling, or redness in your leg.  · You have heavy vaginal bleeding with blood clots.  MAKE SURE YOU:  · Understand these instructions.  · Will watch your condition.  · Will get help right away if you are not  doing well or get worse.     This information is not intended to replace advice given to you by your health care provider. Make sure you discuss any questions you have with your health care provider.     Document Released: 12/06/2012 Document Revised: 12/23/2014 Document Reviewed: 07/03/2014  [employer] Interactive Patient Education ©2016 [employer]          Follow-up Information     1. Follow up with Santo Walsh M.D.. Schedule an appointment as soon as possible for a visit in 1 week.    Specialty:  OB/Gyn    Contact information    [address]  [place] [id number]  [phone]           Discharge Medication Instructions:    Below are the medications your physician expects you to take upon discharge:    Review all your home medications and newly ordered medications with your doctor and/or pharmacist. Follow medication instructions as directed by your doctor and/or pharmacist.    Please keep your medication list with you and share with your physician.               Medication List      CHANGE how you take these medications        Instructions    * oxycodone-acetaminophen 5-325 MG Tabs   What changed:  Another medication with the same name was added. Make sure you understand how and when to take each.   Commonly known as:  PERCOCET    Take 1-2 Tabs by mouth every 3 hours as needed for Moderate Pain ((Pain Scale 4-6)).   Dose:  1-2 Tab       * oxycodone-acetaminophen  MG Tabs   What changed:  You were already taking a medication with the same name, and this prescription was added. Make sure you understand how and when to take each.   Commonly known as:  PERCOCET-10    Take 1-2 Tabs by mouth every four hours as needed for Moderate Pain.   Dose:  1-2 Tab       * Notice:  This list has 2 medication(s) that are the same as other medications prescribed for you. Read the directions carefully, and ask your doctor or other care provider to review them with you.      CONTINUE taking these medications         Instructions    * ibuprofen 200 MG Tabs   Last time this was given:  800 mg on 1/22/2017  8:11 AM   Commonly known as:  MOTRIN    Take 400 mg by mouth every 6 hours as needed for Headache.   Dose:  400 mg       * ibuprofen 800 MG Tabs   Last time this was given:  800 mg on 1/22/2017  8:11 AM   Commonly known as:  MOTRIN    Take 1 Tab by mouth every 8 hours as needed.   Dose:  800 mg       * Notice:  This list has 2 medication(s) that are the same as other medications prescribed for you. Read the directions carefully, and ask your doctor or other care provider to review them with you.            Instructions           Diet / Nutrition:    Follow any diet instructions given to you by your doctor or the dietician, including how much salt (sodium) you are allowed each day.    If you are overweight, talk to your doctor about a weight reduction plan.    Activity:    Remain physically active following your doctor's instructions about exercise and activity.    Rest often.     Any time you become even a little tired or short of breath, SIT DOWN and rest.    Worsening Symptoms:    Report any of the following signs and symptoms to the doctor's office immediately:    *Pain of jaw, arm, or neck  *Chest pain not relieved by medication                               *Dizziness or loss of consciousness  *Difficulty breathing even when at rest   *More tired than usual                                       *Bleeding drainage or swelling of surgical site  *Swelling of feet, ankles, legs or stomach                 *Fever (>100ºF)  *Pink or blood tinged sputum  *Weight gain (3lbs/day or 5lbs /week)           *Shock from internal defibrillator (if applicable)  *Palpitations or irregular heartbeats                *Cool and/or numb extremities    Stroke Awareness    Common Risk Factors for Stroke include:    Age  Atrial Fibrillation  Carotid Artery Stenosis  Diabetes Mellitus  Excessive alcohol consumption  High blood pressure  Overweight    Physical inactivity  Smoking    Warning signs and symptoms of a stroke include:    *Sudden numbness or weakness of the face, arm or leg (especially on one side of the body).  *Sudden confusion, trouble speaking or understanding.  *Sudden trouble seeing in one or both eyes.  *Sudden trouble walking, dizziness, loss of balance or coordination.Sudden severe headache with no known cause.    It is very important to get treatment quickly when a stroke occurs. If you experience any of the above warning signs, call 911 immediately.                   Disclaimer         Quit Smoking / Tobacco Use:    I understand the use of any tobacco products increases my chance of suffering from future heart disease or stroke and could cause other illnesses which may shorten my life. Quitting the use of tobacco products is the single most important thing I can do to improve my health. For further information on smoking / tobacco cessation call a Toll Free Quit Line at 1-833.146.2662 (*National Cancer [place]) or 1-744.526.9748 (American Lung Association) or you can access the web based program at www.[employer]Model [employer].org.    [place] Tobacco Users Help Line:  (617) 626-9623       Toll Free: 1-221.350.9855  Quit Tobacco Program [facility] (721)587-7791    Crisis Hotline:    [place] Crisis Hotline:  2-137-GNCJXAW or 1-571.564.2372    [place] Crisis Hotline:    1-274.612.6067 or 146-911-8531    Discharge Survey:   Thank you for choosing [facility]. We hope we did everything we could to make your hospital stay a pleasant one. You may be receiving a phone survey and we would appreciate your time and participation in answering the questions. Your input is very valuable to us in our efforts to improve our service to our patients and their families.        My signature on this form indicates that:    1. I have reviewed and understand the above information.  2. My questions regarding this information have been answered to my  satisfaction.  3. I have formulated a plan with my discharge nurse to obtain my prescribed medications for home.                  Disclaimer         __________________________________                     __________       ________                       Patient Signature                                                 Date                    Time

## 2017-01-21 NOTE — IP AVS SNAPSHOT
1/22/2017          Mary Anne [place] K[place]  [address] [address] [facility] [address]    Dear Mary Anne:    [facility] wants to ensure your discharge home is safe and you or your loved ones have had all your questions answered regarding your care after you leave the hospital.    You may receive a telephone call within two days of your discharge.  This call is to make certain you understand your discharge instructions as well as ensure we provided you with the best care possible during your stay with us.     The call will only last approximately 3-5 minutes and will be done by a nurse.    Once again, we want to ensure your discharge home is safe and that you have a clear understanding of any next steps in your care.  If you have any questions or concerns, please do not hesitate to contact us, we are here for you.  Thank you for choosing [facility] for your healthcare needs.    Sincerely,    Master Gonzalez    [facility]

## 2017-01-22 VITALS
OXYGEN SATURATION: 95 % | HEIGHT: 67 IN | BODY MASS INDEX: 27.72 KG/M2 | DIASTOLIC BLOOD PRESSURE: 67 MMHG | WEIGHT: 176.59 LBS | RESPIRATION RATE: 17 BRPM | TEMPERATURE: 97.9 F | HEART RATE: 77 BPM | SYSTOLIC BLOOD PRESSURE: 103 MMHG

## 2017-01-22 LAB
BASOPHILS # BLD AUTO: 0.2 % (ref 0–1.8)
BASOPHILS # BLD: 0.04 K/UL (ref 0–0.12)
EOSINOPHIL # BLD AUTO: 0 K/UL (ref 0–0.51)
EOSINOPHIL NFR BLD: 0 % (ref 0–6.9)
ERYTHROCYTE [DISTWIDTH] IN BLOOD BY AUTOMATED COUNT: 39.8 FL (ref 35.9–50)
HCT VFR BLD AUTO: 38.5 % (ref 37–47)
HGB BLD-MCNC: 12.7 G/DL (ref 12–16)
IMM GRANULOCYTES # BLD AUTO: 0.1 K/UL (ref 0–0.11)
IMM GRANULOCYTES NFR BLD AUTO: 0.5 % (ref 0–0.9)
LYMPHOCYTES # BLD AUTO: 1.24 K/UL (ref 1–4.8)
LYMPHOCYTES NFR BLD: 6.3 % (ref 22–41)
MCH RBC QN AUTO: 30 PG (ref 27–33)
MCHC RBC AUTO-ENTMCNC: 33 G/DL (ref 33.6–35)
MCV RBC AUTO: 90.8 FL (ref 81.4–97.8)
MONOCYTES # BLD AUTO: 1.67 K/UL (ref 0–0.85)
MONOCYTES NFR BLD AUTO: 8.4 % (ref 0–13.4)
NEUTROPHILS # BLD AUTO: 16.78 K/UL (ref 2–7.15)
NEUTROPHILS NFR BLD: 84.6 % (ref 44–72)
NRBC # BLD AUTO: 0 K/UL
NRBC BLD AUTO-RTO: 0 /100 WBC
PLATELET # BLD AUTO: 262 K/UL (ref 164–446)
PMV BLD AUTO: 9.5 FL (ref 9–12.9)
RBC # BLD AUTO: 4.24 M/UL (ref 4.2–5.4)
WBC # BLD AUTO: 19.8 K/UL (ref 4.8–10.8)

## 2017-01-22 PROCEDURE — 85025 COMPLETE CBC W/AUTO DIFF WBC: CPT

## 2017-01-22 PROCEDURE — A9270 NON-COVERED ITEM OR SERVICE: HCPCS | Performed by: SPECIALIST

## 2017-01-22 PROCEDURE — G0378 HOSPITAL OBSERVATION PER HR: HCPCS

## 2017-01-22 PROCEDURE — 700102 HCHG RX REV CODE 250 W/ 637 OVERRIDE(OP): Performed by: SPECIALIST

## 2017-01-22 PROCEDURE — 36415 COLL VENOUS BLD VENIPUNCTURE: CPT

## 2017-01-22 RX ORDER — IBUPROFEN 800 MG/1
800 TABLET ORAL EVERY 8 HOURS PRN
Qty: 30 TAB | Refills: 3 | Status: SHIPPED | OUTPATIENT
Start: 2017-01-22 | End: 2019-06-02

## 2017-01-22 RX ORDER — OXYCODONE AND ACETAMINOPHEN 10; 325 MG/1; MG/1
1 TABLET ORAL EVERY 4 HOURS PRN
Status: DISCONTINUED | OUTPATIENT
Start: 2017-01-22 | End: 2017-01-22 | Stop reason: HOSPADM

## 2017-01-22 RX ORDER — OXYCODONE AND ACETAMINOPHEN 10; 325 MG/1; MG/1
1-2 TABLET ORAL EVERY 4 HOURS PRN
Qty: 45 TAB | Refills: 0 | Status: SHIPPED | OUTPATIENT
Start: 2017-01-22 | End: 2019-06-02

## 2017-01-22 RX ADMIN — OXYCODONE AND ACETAMINOPHEN 1 TABLET: 7.5; 325 TABLET ORAL at 03:39

## 2017-01-22 RX ADMIN — OXYCODONE AND ACETAMINOPHEN 1 TABLET: 7.5; 325 TABLET ORAL at 06:36

## 2017-01-22 RX ADMIN — OXYCODONE AND ACETAMINOPHEN 1 TABLET: 7.5; 325 TABLET ORAL at 09:42

## 2017-01-22 RX ADMIN — IBUPROFEN 800 MG: 800 TABLET, FILM COATED ORAL at 08:11

## 2017-01-22 RX ADMIN — OXYCODONE HYDROCHLORIDE AND ACETAMINOPHEN 1 TABLET: 10; 325 TABLET ORAL at 13:27

## 2017-01-22 ASSESSMENT — PAIN SCALES - GENERAL
PAINLEVEL_OUTOF10: 7

## 2017-01-22 NOTE — PROGRESS NOTES
POST OP DAY # 1  The patient says that she has abdominal soreness.   She says that she feels fine otherwise.   She says that she has been ambulating and urinating and tolerating oral fluids.   The patient's vital signs have been stable and she has been afebrile.   She appears well developed and well nourished and relaxed and alert and comfortable and in no apparent distress.   Labs: The patient's hemoglobin went from 14.6 pre-op to 12.7 post-op   We will continue to have the patient ambulate and will continue to give analgesia prn.   Will discharge home today with Rx's for percocet and ibuprofen  She says that she does have an appointment to follow up with me in the office in about 2 weeks for a post op visit.   Santo Walsh M.D.

## 2017-01-22 NOTE — PROGRESS NOTES
Pt is A&Ox4. Reports pain to lower back and pelvis, medicated per MAR. BURGOS, CMS intact, denies n/t or dizziness upon mobilization, requires SBA at this time, educated to call appropriately. On RA, denies SOB. Normoactive BS x 4. Tolerating clear liquids. Denies n/v. No flatus or BM at this time. Lap sites x 4 to lower abdomen/pelvic region c/d/i. Azar to dd. PIV patent. Updated on POC. Belongings and call light within reach. All needs met at this time.     Refuses BA, educated on risk to fall, verbalizes understanding and still declines. Call light in reach, calls appropriately for assistance.

## 2017-01-22 NOTE — CARE PLAN
Problem: Venous Thromboembolism (VTW)/Deep Vein Thrombosis (DVT) Prevention:  Goal: Patient will participate in Venous Thrombosis (VTE)/Deep Vein Thrombosis (DVT)Prevention Measures  Outcome: PROGRESSING AS EXPECTED  SCDs in place. Pt mobilized to chair with x1 assistance.     Problem: Pain Management  Goal: Pain level will decrease to patient’s comfort goal  Outcome: PROGRESSING AS EXPECTED  Pain managed with Percocet and Morphine PRN.

## 2017-01-22 NOTE — PROGRESS NOTES
Assumed care of pt at 0730. Received report from Bree SALCEDO. I have reviewed all labs, current orders and the plan of care. Pt is alert and oriented x 4.  Medicated for pain this morning with PO PRN pain medication. Pt is tolerating a regular diet. Denies nausea. Denies any chest pian or SOB. Azar removed at 0630. Pt is voiding now without difficulty. Call light within reach. VSS. Pt uses call light appropriately.

## 2017-01-22 NOTE — DISCHARGE INSTRUCTIONS
Discharge Instructions    Discharged to home by car with relative. Discharged via wheelchair, hospital escort: Yes.  Special equipment needed: Not Applicable    Be sure to schedule a follow-up appointment with your primary care doctor or any specialists as instructed.     Discharge Plan:   Smoking Cessation Offered: Patient Refused  Influenza Vaccine Indication: Patient Refuses    I understand that a diet low in cholesterol, fat, and sodium is recommended for good health. Unless I have been given specific instructions below for another diet, I accept this instruction as my diet prescription.   Other diet: regular    Special Instructions: None    · Is patient discharged on Warfarin / Coumadin?   No     · Is patient Post Blood Transfusion?  No    Depression / Suicide Risk    As you are discharged from this [facility] facility, it is important to learn how to keep safe from harming yourself.    Recognize the warning signs:  · Abrupt changes in personality, positive or negative- including increase in energy   · Giving away possessions  · Change in eating patterns- significant weight changes-  positive or negative  · Change in sleeping patterns- unable to sleep or sleeping all the time   · Unwillingness or inability to communicate  · Depression  · Unusual sadness, discouragement and loneliness  · Talk of wanting to die  · Neglect of personal appearance   · Rebelliousness- reckless behavior  · Withdrawal from people/activities they love  · Confusion- inability to concentrate     If you or a loved one observes any of these behaviors or has concerns about self-harm, here's what you can do:  · Talk about it- your feelings and reasons for harming yourself  · Remove any means that you might use to hurt yourself (examples: pills, rope, extension cords, firearm)  · Get professional help from the community (Mental Health, Substance Abuse, psychological counseling)  · Do not be alone:Call your Safe Contact- someone whom you  trust who will be there for you.  · Call your local CRISIS HOTLINE 990-2284 or 125-798-5698  · Call your local Children's Mobile Crisis Response Team [place] (801) 969-6117 or www.[employer]  · Call the toll free National Suicide Prevention Hotlines   · National Suicide Prevention Lifeline 106-047-WCHX (5188)  · National Mobile Line Network 800-SUICIDE (677-0630)  Laparoscopically Assisted Vaginal Hysterectomy, Care After  Refer to this sheet in the next few weeks. These instructions provide you with information on caring for yourself after your procedure. Your health care provider may also give you more specific instructions. Your treatment has been planned according to current medical practices, but problems sometimes occur. Call your health care provider if you have any problems or questions after your procedure.  WHAT TO EXPECT AFTER THE PROCEDURE  After your procedure, it is typical to have the following:  · Abdominal pain. You will be given pain medicine to control it.  · Sore throat from the breathing tube that was inserted during surgery.  HOME CARE INSTRUCTIONS  · Only take over-the-counter or prescription medicines for pain, discomfort, or fever as directed by your health care provider.  · Do not take aspirin. It can cause bleeding.  · Do not drive when taking pain medicine.  · Follow your health care provider's advice regarding diet, exercise, lifting, driving, and general activities.  · Resume your usual diet as directed and allowed.  · Get plenty of rest and sleep.  · Do not douche, use tampons, or have sexual intercourse for at least 6 weeks, or until your health care provider gives you permission.  · Change your bandages (dressings) as directed by your health care provider.  · Monitor your temperature and notify your health care provider of a fever.  · Take showers instead of baths for 2-3 weeks.  · Do not drink alcohol until your health care provider gives you permission.  · If you develop  constipation, you may take a mild laxative with your health care provider's permission. Bran foods may help with constipation problems. Drinking enough fluids to keep your urine clear or pale yellow may help as well.  · Try to have someone home with you for 1-2 weeks to help around the house.  · Keep all of your follow-up appointments as directed by your health care provider.  SEEK MEDICAL CARE IF:   · You have swelling, redness, or increasing pain around your incision sites.  · You have pus coming from your incision.  · You notice a bad smell coming from your incision.  · Your incision breaks open.  · You feel dizzy or lightheaded.  · You have pain or bleeding when you urinate.  · You have persistent diarrhea.  · You have persistent nausea and vomiting.  · You have abnormal vaginal discharge.  · You have a rash.  · You have any type of abnormal reaction or develop an allergy to your medicine.  · You have poor pain control with your prescribed medicine.  SEEK IMMEDIATE MEDICAL CARE IF:   · You have a fever.  · You have severe abdominal pain.  · You have chest pain.  · You have shortness of breath.  · You faint.  · You have pain, swelling, or redness in your leg.  · You have heavy vaginal bleeding with blood clots.  MAKE SURE YOU:  · Understand these instructions.  · Will watch your condition.  · Will get help right away if you are not doing well or get worse.     This information is not intended to replace advice given to you by your health care provider. Make sure you discuss any questions you have with your health care provider.     Document Released: 12/06/2012 Document Revised: 12/23/2014 Document Reviewed: 07/03/2014  [employer] Interactive Patient Education ©2016 [employer]

## 2017-01-22 NOTE — PROGRESS NOTES
"Pt arrived to unit via gurney. Family at bedside. A&Ox4. VS: /74 mmHg  Pulse 68  Temp(Src) 36.6 °C (97.8 °F)  Resp 17  Ht 1.702 m (5' 7\")  Wt 80.1 kg (176 lb 9.4 oz)  BMI 27.65 kg/m2  SpO2 94%  LMP 11/01/2016 (Approximate). Pt denies n/v, numbness, tingling, SOB and chest pain. Pt states pain 7/10, prn pain medications administered per MAR. Lap stab sites x4 CDI. Ice place in place to assist with pain. Pt needs met at this time, call light within reach, hourly rounding in effect, and will continue to monitor.       "

## 2017-01-23 NOTE — PROGRESS NOTES
Pt was given all discharge instructions and prescriptions at the time of discharge. Reviewed ss of infection and when to report. Reviewed all other discharge instructions. Pt verbalized understanding all RN and MD instructions. PT was medicated for pain prior to discharge. Tolerating a regular diet, ambulating well, and voiding.

## 2017-05-31 NOTE — ADDENDUM NOTE
Encounter addended by: Radha Bhat R.N. on: 5/31/2017  2:32 PM<BR>     Documentation filed: Inpatient Document Flowsheet

## 2018-12-02 ENCOUNTER — OFFICE VISIT (OUTPATIENT)
Dept: URGENT CARE | Facility: PHYSICIAN GROUP | Age: 28
End: 2018-12-02

## 2018-12-02 ENCOUNTER — HOSPITAL ENCOUNTER (OUTPATIENT)
Facility: MEDICAL CENTER | Age: 28
End: 2018-12-02
Attending: NURSE PRACTITIONER

## 2018-12-02 VITALS
TEMPERATURE: 98.1 F | WEIGHT: 166 LBS | SYSTOLIC BLOOD PRESSURE: 116 MMHG | OXYGEN SATURATION: 98 % | RESPIRATION RATE: 18 BRPM | HEART RATE: 70 BPM | DIASTOLIC BLOOD PRESSURE: 74 MMHG | BODY MASS INDEX: 26.06 KG/M2 | HEIGHT: 67 IN

## 2018-12-02 DIAGNOSIS — N30.01 ACUTE CYSTITIS WITH HEMATURIA: ICD-10-CM

## 2018-12-02 LAB
APPEARANCE UR: CLEAR
BILIRUB UR STRIP-MCNC: NORMAL MG/DL
COLOR UR AUTO: YELLOW
GLUCOSE UR STRIP.AUTO-MCNC: NORMAL MG/DL
INT CON NEG: NORMAL
INT CON POS: NORMAL
KETONES UR STRIP.AUTO-MCNC: NORMAL MG/DL
LEUKOCYTE ESTERASE UR QL STRIP.AUTO: NORMAL
NITRITE UR QL STRIP.AUTO: POSITIVE
PH UR STRIP.AUTO: 7 [PH] (ref 5–8)
POC URINE PREGNANCY TEST: NEGATIVE
PROT UR QL STRIP: NORMAL MG/DL
RBC UR QL AUTO: NORMAL
SP GR UR STRIP.AUTO: 1.01
UROBILINOGEN UR STRIP-MCNC: 0.2 MG/DL

## 2018-12-02 PROCEDURE — 87086 URINE CULTURE/COLONY COUNT: CPT

## 2018-12-02 PROCEDURE — 99204 OFFICE O/P NEW MOD 45 MIN: CPT | Performed by: NURSE PRACTITIONER

## 2018-12-02 PROCEDURE — 81002 URINALYSIS NONAUTO W/O SCOPE: CPT | Performed by: NURSE PRACTITIONER

## 2018-12-02 PROCEDURE — 81025 URINE PREGNANCY TEST: CPT | Performed by: NURSE PRACTITIONER

## 2018-12-02 RX ORDER — NITROFURANTOIN 25; 75 MG/1; MG/1
100 CAPSULE ORAL EVERY 12 HOURS
Qty: 10 CAP | Refills: 0 | Status: SHIPPED | OUTPATIENT
Start: 2018-12-02 | End: 2018-12-07

## 2018-12-02 NOTE — PROGRESS NOTES
Chief Complaint   Patient presents with   • UTI     dysuria and frequency x 1 day       HISTORY OF PRESENT ILLNESS: Patient is a 28 y.o. female who presents today due to two days of urinary burning, hematuria, urgency, and frequency. Reports some associated pelvic pressure. Denies fever, flank pain, N/V. Denies a history of pyelonephritis or kidney stones. She has tried AZO and cranberry tablets. Admits to a history of UTIs, last one was one year ago, this feels similar.     Patient Active Problem List    Diagnosis Date Noted   • Moderate dysplasia of cervix 01/21/2017   • Female dyspareunia 01/21/2017   • Menorrhagia 03/14/2016   • Non-reassuring electronic fetal monitoring tracing 07/26/2014   • Rh negative state in antepartum period 03/17/2014   • Encounter for supervision of other normal pregnancy 03/14/2014   • BV (bacterial vaginosis) 03/14/2014   • Vaginal yeast infection 03/14/2014       Allergies:Nkda [no known drug allergy]    Current Outpatient Prescriptions Ordered in [facility]   Medication Sig Dispense Refill   • nitrofurantoin monohydr macro (MACROBID) 100 MG Cap Take 1 Cap by mouth every 12 hours for 5 days. 10 Cap 0   • oxycodone-acetaminophen (PERCOCET-10)  MG Tab Take 1-2 Tabs by mouth every four hours as needed for Moderate Pain. 45 Tab 0   • ibuprofen (MOTRIN) 800 MG Tab Take 1 Tab by mouth every 8 hours as needed. 30 Tab 3   • oxycodone-acetaminophen (PERCOCET) 5-325 MG Tab Take 1-2 Tabs by mouth every 3 hours as needed for Moderate Pain ((Pain Scale 4-6)). (Patient taking differently: Take 1-2 Tabs by mouth every 3 hours as needed for Moderate Pain ((Pain Scale 4-6)). Indications: for migraines) 30 Tab 0   • ibuprofen (MOTRIN) 200 MG Tab Take 400 mg by mouth every 6 hours as needed for Headache.       No current Epic-ordered facility-administered medications on file.        Past Medical History:   Diagnosis Date   • Bronchitis 2014   • Pain     pelvis    • Rh negative state in antepartum  "period 3/17/2014       Social History   Substance Use Topics   • Smoking status: Current Every Day Smoker     Packs/day: 2.00     Years: 9.00     Types: Cigarettes   • Smokeless tobacco: Never Used      Comment: 5-7 cigarettes per day    • Alcohol use Yes      Comment: 6 per week        Family Status   Relation Status   • Mo Alive   • Fa Alive   • MGMo Alive   • MGFa Alive   • PGMo Alive   • PGFa Alive     Family History   Problem Relation Age of Onset   • Hypertension Father        ROS:  Review of Systems   Constitutional: Negative for fever, chills, weight loss and malaise/fatigue.   HENT: Negative for ear pain, nosebleeds, congestion, sore throat and neck pain.    Eyes: Negative for vision changes.   Neuro: Negative for headache, sensory changes, weakness, seizure, LOC.   Cardiovascular: Negative for chest pain, palpitations, orthopnea and leg swelling.   Respiratory: Negative for cough, sputum production, shortness of breath and wheezing.   Gastrointestinal: Positive for lower abdominal pressure. Negative for abdominal pain, nausea, vomiting or diarrhea.   Genitourinary: Positive for dysuria, urgency and frequency. Negative for hematuria or flank pain.   Skin: Negative for rash, diaphoresis.     Exam:  Blood pressure 116/74, pulse 70, temperature 36.7 °C (98.1 °F), temperature source Temporal, resp. rate 18, height 1.702 m (5' 7\"), weight 75.3 kg (166 lb), SpO2 98 %.  General: well-nourished, well-developed female in NAD  Head: normocephalic, atraumatic  Eyes: PERRLA, no conjunctival injection, acuity grossly intact, lids normal.  Lymph: no cervical adenopathy. No supraclavicular adenopathy.   Neuro: alert and oriented. Cranial nerves 1-12 grossly intact. No sensory deficit.   Cardiovascular: regular rate and rhythm. No edema.  Pulmonary: no distress. Chest is symmetrical with respiration, no wheezes, crackles, or rhonchi.   Abdomen: soft, non-tender, no guarding, no hepatosplenomegaly. No CVA tenderness. "   Musculoskeletal: no clubbing, appropriate muscle tone, gait is stable.  Skin: warm, dry, intact, no clubbing, no cyanosis, no rashes.   Psych: appropriate mood, affect, judgement.         Assessment/Plan:  1. Acute cystitis with hematuria  POCT Urinalysis    POCT PREGNANCY    nitrofurantoin monohydr macro (MACROBID) 100 MG Cap         Antibiotic as directed. Increase fluid intake. Urine sent for culture.   Supportive care, differential diagnoses, and indications for immediate follow-up discussed with patient.   Pathogenesis of diagnosis discussed including typical length and natural progression.   Instructed to return to clinic or nearest emergency department for any change in condition, further concerns, or worsening of symptoms.  Patient states understanding of the plan of care and discharge instructions.  Instructed to make an appointment, for follow up, with their primary care provider.        Please note that this dictation was created using voice recognition software. I have made every reasonable attempt to correct obvious errors, but I expect that there are errors of grammar and possibly content that I did not discover before finalizing the note.      LIBIA Iniguez.

## 2018-12-04 ENCOUNTER — TELEPHONE (OUTPATIENT)
Dept: URGENT CARE | Facility: CLINIC | Age: 28
End: 2018-12-04

## 2018-12-04 LAB
BACTERIA UR CULT: NORMAL
SIGNIFICANT IND 70042: NORMAL
SITE SITE: NORMAL
SOURCE SOURCE: NORMAL

## 2018-12-04 NOTE — TELEPHONE ENCOUNTER
The patient was called for re-evaluation, urine culture negative, a message was left, encouraged to call back to the clinic or return to clinic with any questions or concerns.         LIBIA Iniguez.

## 2019-01-04 ENCOUNTER — NON-PROVIDER VISIT (OUTPATIENT)
Dept: URGENT CARE | Facility: PHYSICIAN GROUP | Age: 29
End: 2019-01-04

## 2019-01-04 DIAGNOSIS — Z02.1 PRE-EMPLOYMENT DRUG SCREENING: ICD-10-CM

## 2019-01-04 LAB
AMP AMPHETAMINE: NORMAL
COC COCAINE: NORMAL
INT CON NEG: NORMAL
INT CON POS: NORMAL
MET METHAMPHETAMINES: NORMAL
OPI OPIATES: NORMAL
PCP PHENCYCLIDINE: NORMAL
POC DRUG COMMENT 753798-OCCUPATIONAL HEALTH: NEGATIVE
THC: NORMAL

## 2019-01-04 PROCEDURE — 80305 DRUG TEST PRSMV DIR OPT OBS: CPT | Performed by: PHYSICIAN ASSISTANT

## 2019-06-01 ENCOUNTER — HOSPITAL ENCOUNTER (INPATIENT)
Facility: MEDICAL CENTER | Age: 29
LOS: 1 days | DRG: 604 | End: 2019-06-03
Attending: EMERGENCY MEDICINE | Admitting: HOSPITALIST
Payer: MEDICAID

## 2019-06-01 DIAGNOSIS — R41.82 ALTERED MENTAL STATUS, UNSPECIFIED ALTERED MENTAL STATUS TYPE: ICD-10-CM

## 2019-06-01 DIAGNOSIS — F10.929 ALCOHOLIC INTOXICATION WITH COMPLICATION (HCC): ICD-10-CM

## 2019-06-01 DIAGNOSIS — J69.0 ASPIRATION PNEUMONIA OF BOTH LUNGS, UNSPECIFIED ASPIRATION PNEUMONIA TYPE, UNSPECIFIED PART OF LUNG (HCC): ICD-10-CM

## 2019-06-01 DIAGNOSIS — R09.02 HYPOXIA: ICD-10-CM

## 2019-06-01 DIAGNOSIS — F32.A DEPRESSION, UNSPECIFIED DEPRESSION TYPE: ICD-10-CM

## 2019-06-01 LAB
ALBUMIN SERPL BCP-MCNC: 4.3 G/DL (ref 3.2–4.9)
ALBUMIN/GLOB SERPL: 1.7 G/DL
ALP SERPL-CCNC: 100 U/L (ref 30–99)
ALT SERPL-CCNC: 142 U/L (ref 2–50)
ANION GAP SERPL CALC-SCNC: 12 MMOL/L (ref 0–11.9)
APAP SERPL-MCNC: <10 UG/ML (ref 10–30)
AST SERPL-CCNC: 74 U/L (ref 12–45)
BASOPHILS # BLD AUTO: 1.7 % (ref 0–1.8)
BASOPHILS # BLD: 0.12 K/UL (ref 0–0.12)
BILIRUB SERPL-MCNC: 0.4 MG/DL (ref 0.1–1.5)
BUN SERPL-MCNC: 13 MG/DL (ref 8–22)
CALCIUM SERPL-MCNC: 8.9 MG/DL (ref 8.5–10.5)
CHLORIDE SERPL-SCNC: 111 MMOL/L (ref 96–112)
CO2 SERPL-SCNC: 23 MMOL/L (ref 20–33)
CREAT SERPL-MCNC: 0.86 MG/DL (ref 0.5–1.4)
EOSINOPHIL # BLD AUTO: 0.13 K/UL (ref 0–0.51)
EOSINOPHIL NFR BLD: 1.9 % (ref 0–6.9)
ERYTHROCYTE [DISTWIDTH] IN BLOOD BY AUTOMATED COUNT: 43.8 FL (ref 35.9–50)
ETHANOL BLD-MCNC: 0.49 G/DL
GLOBULIN SER CALC-MCNC: 2.6 G/DL (ref 1.9–3.5)
GLUCOSE SERPL-MCNC: 110 MG/DL (ref 65–99)
HCG SERPL QL: NEGATIVE
HCT VFR BLD AUTO: 47.4 % (ref 37–47)
HGB BLD-MCNC: 16 G/DL (ref 12–16)
IMM GRANULOCYTES # BLD AUTO: 0.06 K/UL (ref 0–0.11)
IMM GRANULOCYTES NFR BLD AUTO: 0.9 % (ref 0–0.9)
LYMPHOCYTES # BLD AUTO: 3.01 K/UL (ref 1–4.8)
LYMPHOCYTES NFR BLD: 43.1 % (ref 22–41)
MCH RBC QN AUTO: 31.7 PG (ref 27–33)
MCHC RBC AUTO-ENTMCNC: 33.8 G/DL (ref 33.6–35)
MCV RBC AUTO: 93.9 FL (ref 81.4–97.8)
MONOCYTES # BLD AUTO: 0.49 K/UL (ref 0–0.85)
MONOCYTES NFR BLD AUTO: 7 % (ref 0–13.4)
NEUTROPHILS # BLD AUTO: 3.18 K/UL (ref 2–7.15)
NEUTROPHILS NFR BLD: 45.4 % (ref 44–72)
NRBC # BLD AUTO: 0 K/UL
NRBC BLD-RTO: 0 /100 WBC
PLATELET # BLD AUTO: 325 K/UL (ref 164–446)
PMV BLD AUTO: 8.8 FL (ref 9–12.9)
POTASSIUM SERPL-SCNC: 3.9 MMOL/L (ref 3.6–5.5)
PROT SERPL-MCNC: 6.9 G/DL (ref 6–8.2)
RBC # BLD AUTO: 5.05 M/UL (ref 4.2–5.4)
SALICYLATES SERPL-MCNC: 0 MG/DL (ref 15–25)
SODIUM SERPL-SCNC: 146 MMOL/L (ref 135–145)
WBC # BLD AUTO: 7 K/UL (ref 4.8–10.8)

## 2019-06-01 PROCEDURE — 700111 HCHG RX REV CODE 636 W/ 250 OVERRIDE (IP): Performed by: EMERGENCY MEDICINE

## 2019-06-01 PROCEDURE — 96375 TX/PRO/DX INJ NEW DRUG ADDON: CPT

## 2019-06-01 PROCEDURE — 84703 CHORIONIC GONADOTROPIN ASSAY: CPT

## 2019-06-01 PROCEDURE — 80307 DRUG TEST PRSMV CHEM ANLYZR: CPT

## 2019-06-01 PROCEDURE — 85025 COMPLETE CBC W/AUTO DIFF WBC: CPT

## 2019-06-01 PROCEDURE — 304561 HCHG STAT O2

## 2019-06-01 PROCEDURE — 80053 COMPREHEN METABOLIC PANEL: CPT

## 2019-06-01 PROCEDURE — 99285 EMERGENCY DEPT VISIT HI MDM: CPT

## 2019-06-01 RX ORDER — HALOPERIDOL 5 MG/ML
5 INJECTION INTRAMUSCULAR ONCE
Status: COMPLETED | OUTPATIENT
Start: 2019-06-01 | End: 2019-06-01

## 2019-06-01 RX ORDER — DIPHENHYDRAMINE HYDROCHLORIDE 50 MG/ML
25 INJECTION INTRAMUSCULAR; INTRAVENOUS ONCE
Status: COMPLETED | OUTPATIENT
Start: 2019-06-01 | End: 2019-06-01

## 2019-06-01 RX ADMIN — DIPHENHYDRAMINE HYDROCHLORIDE 25 MG: 50 INJECTION INTRAMUSCULAR; INTRAVENOUS at 22:22

## 2019-06-01 RX ADMIN — HALOPERIDOL LACTATE 5 MG: 5 INJECTION, SOLUTION INTRAMUSCULAR at 22:22

## 2019-06-02 ENCOUNTER — APPOINTMENT (OUTPATIENT)
Dept: RADIOLOGY | Facility: MEDICAL CENTER | Age: 29
DRG: 604 | End: 2019-06-02
Attending: EMERGENCY MEDICINE
Payer: MEDICAID

## 2019-06-02 PROBLEM — Z72.0 TOBACCO ABUSE: Status: ACTIVE | Noted: 2019-06-02

## 2019-06-02 PROBLEM — J69.0 ASPIRATION PNEUMONIA (HCC): Status: ACTIVE | Noted: 2019-06-02

## 2019-06-02 PROBLEM — F10.929 ALCOHOL INTOXICATION (HCC): Status: ACTIVE | Noted: 2019-06-02

## 2019-06-02 PROBLEM — F43.10 PTSD (POST-TRAUMATIC STRESS DISORDER): Status: ACTIVE | Noted: 2019-06-02

## 2019-06-02 PROBLEM — R00.0 TACHYCARDIA: Status: ACTIVE | Noted: 2019-06-02

## 2019-06-02 PROBLEM — Z72.89 SELF-MUTILATION: Status: ACTIVE | Noted: 2019-06-02

## 2019-06-02 PROBLEM — I44.1 SECOND DEGREE AV BLOCK, MOBITZ TYPE II: Status: ACTIVE | Noted: 2019-06-02

## 2019-06-02 LAB
ALBUMIN SERPL BCP-MCNC: 3.7 G/DL (ref 3.2–4.9)
ALBUMIN/GLOB SERPL: 1.5 G/DL
ALP SERPL-CCNC: 70 U/L (ref 30–99)
ALT SERPL-CCNC: 125 U/L (ref 2–50)
ANION GAP SERPL CALC-SCNC: 8 MMOL/L (ref 0–11.9)
AST SERPL-CCNC: 62 U/L (ref 12–45)
BILIRUB SERPL-MCNC: 1.3 MG/DL (ref 0.1–1.5)
BUN SERPL-MCNC: 16 MG/DL (ref 8–22)
CALCIUM SERPL-MCNC: 8.7 MG/DL (ref 8.5–10.5)
CHLORIDE SERPL-SCNC: 103 MMOL/L (ref 96–112)
CO2 SERPL-SCNC: 26 MMOL/L (ref 20–33)
CREAT SERPL-MCNC: 0.74 MG/DL (ref 0.5–1.4)
D DIMER PPP IA.FEU-MCNC: 0.86 UG/ML (FEU) (ref 0–0.5)
GLOBULIN SER CALC-MCNC: 2.4 G/DL (ref 1.9–3.5)
GLUCOSE SERPL-MCNC: 122 MG/DL (ref 65–99)
HCG SERPL QL: NEGATIVE
LACTATE BLD-SCNC: 1.6 MMOL/L (ref 0.5–2)
MAGNESIUM SERPL-MCNC: 1.7 MG/DL (ref 1.5–2.5)
POC BREATHALIZER: 0.08 PERCENT (ref 0–0.01)
POC BREATHALIZER: 0.1 PERCENT (ref 0–0.01)
POTASSIUM SERPL-SCNC: 3.8 MMOL/L (ref 3.6–5.5)
PROCALCITONIN SERPL-MCNC: <0.05 NG/ML
PROT SERPL-MCNC: 6.1 G/DL (ref 6–8.2)
SODIUM SERPL-SCNC: 137 MMOL/L (ref 135–145)
T4 FREE SERPL-MCNC: 0.78 NG/DL (ref 0.53–1.43)
TSH SERPL DL<=0.005 MIU/L-ACNC: 0.45 UIU/ML (ref 0.38–5.33)

## 2019-06-02 PROCEDURE — 87040 BLOOD CULTURE FOR BACTERIA: CPT

## 2019-06-02 PROCEDURE — 84703 CHORIONIC GONADOTROPIN ASSAY: CPT

## 2019-06-02 PROCEDURE — 302970 POC BREATHALIZER

## 2019-06-02 PROCEDURE — 80053 COMPREHEN METABOLIC PANEL: CPT

## 2019-06-02 PROCEDURE — 93005 ELECTROCARDIOGRAM TRACING: CPT | Performed by: HOSPITALIST

## 2019-06-02 PROCEDURE — 302970 POC BREATHALIZER: Performed by: EMERGENCY MEDICINE

## 2019-06-02 PROCEDURE — 770020 HCHG ROOM/CARE - TELE (206)

## 2019-06-02 PROCEDURE — 93010 ELECTROCARDIOGRAM REPORT: CPT | Performed by: INTERNAL MEDICINE

## 2019-06-02 PROCEDURE — 96365 THER/PROPH/DIAG IV INF INIT: CPT

## 2019-06-02 PROCEDURE — 306588 SLEEVE,VASO CALF MED: Performed by: HOSPITALIST

## 2019-06-02 PROCEDURE — 36415 COLL VENOUS BLD VENIPUNCTURE: CPT

## 2019-06-02 PROCEDURE — 99223 1ST HOSP IP/OBS HIGH 75: CPT | Performed by: INTERNAL MEDICINE

## 2019-06-02 PROCEDURE — 700111 HCHG RX REV CODE 636 W/ 250 OVERRIDE (IP): Performed by: EMERGENCY MEDICINE

## 2019-06-02 PROCEDURE — 700117 HCHG RX CONTRAST REV CODE 255: Performed by: EMERGENCY MEDICINE

## 2019-06-02 PROCEDURE — 700105 HCHG RX REV CODE 258: Performed by: HOSPITALIST

## 2019-06-02 PROCEDURE — 85379 FIBRIN DEGRADATION QUANT: CPT

## 2019-06-02 PROCEDURE — 83605 ASSAY OF LACTIC ACID: CPT

## 2019-06-02 PROCEDURE — 700111 HCHG RX REV CODE 636 W/ 250 OVERRIDE (IP): Performed by: HOSPITALIST

## 2019-06-02 PROCEDURE — A9270 NON-COVERED ITEM OR SERVICE: HCPCS | Performed by: HOSPITALIST

## 2019-06-02 PROCEDURE — 71275 CT ANGIOGRAPHY CHEST: CPT

## 2019-06-02 PROCEDURE — 99223 1ST HOSP IP/OBS HIGH 75: CPT | Performed by: HOSPITALIST

## 2019-06-02 PROCEDURE — 700105 HCHG RX REV CODE 258: Performed by: EMERGENCY MEDICINE

## 2019-06-02 PROCEDURE — 84443 ASSAY THYROID STIM HORMONE: CPT

## 2019-06-02 PROCEDURE — 84439 ASSAY OF FREE THYROXINE: CPT

## 2019-06-02 PROCEDURE — 90791 PSYCH DIAGNOSTIC EVALUATION: CPT

## 2019-06-02 PROCEDURE — 700102 HCHG RX REV CODE 250 W/ 637 OVERRIDE(OP): Performed by: HOSPITALIST

## 2019-06-02 PROCEDURE — 71045 X-RAY EXAM CHEST 1 VIEW: CPT

## 2019-06-02 PROCEDURE — 302135 SEQUENTIAL COMPRESSION MACHINE: Performed by: HOSPITALIST

## 2019-06-02 PROCEDURE — 83735 ASSAY OF MAGNESIUM: CPT

## 2019-06-02 PROCEDURE — 84145 PROCALCITONIN (PCT): CPT

## 2019-06-02 RX ORDER — BUDESONIDE AND FORMOTEROL FUMARATE DIHYDRATE 160; 4.5 UG/1; UG/1
2 AEROSOL RESPIRATORY (INHALATION)
Status: DISCONTINUED | OUTPATIENT
Start: 2019-06-02 | End: 2019-06-03 | Stop reason: HOSPADM

## 2019-06-02 RX ORDER — MAGNESIUM SULFATE HEPTAHYDRATE 40 MG/ML
2 INJECTION, SOLUTION INTRAVENOUS ONCE
Status: COMPLETED | OUTPATIENT
Start: 2019-06-02 | End: 2019-06-02

## 2019-06-02 RX ORDER — SODIUM CHLORIDE 9 MG/ML
1000 INJECTION, SOLUTION INTRAVENOUS
Status: COMPLETED | OUTPATIENT
Start: 2019-06-02 | End: 2019-06-02

## 2019-06-02 RX ORDER — PROMETHAZINE HYDROCHLORIDE 25 MG/1
12.5-25 SUPPOSITORY RECTAL EVERY 4 HOURS PRN
Status: DISCONTINUED | OUTPATIENT
Start: 2019-06-02 | End: 2019-06-03 | Stop reason: HOSPADM

## 2019-06-02 RX ORDER — AMOXICILLIN 250 MG
2 CAPSULE ORAL 2 TIMES DAILY
Status: DISCONTINUED | OUTPATIENT
Start: 2019-06-02 | End: 2019-06-03 | Stop reason: HOSPADM

## 2019-06-02 RX ORDER — LORAZEPAM 2 MG/ML
2 INJECTION INTRAMUSCULAR
Status: DISCONTINUED | OUTPATIENT
Start: 2019-06-02 | End: 2019-06-03 | Stop reason: HOSPADM

## 2019-06-02 RX ORDER — FOLIC ACID 1 MG/1
1 TABLET ORAL DAILY
Status: DISCONTINUED | OUTPATIENT
Start: 2019-06-02 | End: 2019-06-03 | Stop reason: HOSPADM

## 2019-06-02 RX ORDER — LORAZEPAM 2 MG/1
2 TABLET ORAL
Status: DISCONTINUED | OUTPATIENT
Start: 2019-06-02 | End: 2019-06-03 | Stop reason: HOSPADM

## 2019-06-02 RX ORDER — ACETAMINOPHEN 325 MG/1
650 TABLET ORAL EVERY 6 HOURS PRN
Status: DISCONTINUED | OUTPATIENT
Start: 2019-06-02 | End: 2019-06-03 | Stop reason: HOSPADM

## 2019-06-02 RX ORDER — THIAMINE MONONITRATE (VIT B1) 100 MG
100 TABLET ORAL DAILY
Status: DISCONTINUED | OUTPATIENT
Start: 2019-06-02 | End: 2019-06-03 | Stop reason: HOSPADM

## 2019-06-02 RX ORDER — LORAZEPAM 1 MG/1
1 TABLET ORAL EVERY 4 HOURS PRN
Status: DISCONTINUED | OUTPATIENT
Start: 2019-06-02 | End: 2019-06-03 | Stop reason: HOSPADM

## 2019-06-02 RX ORDER — ONDANSETRON 2 MG/ML
4 INJECTION INTRAMUSCULAR; INTRAVENOUS EVERY 4 HOURS PRN
Status: DISCONTINUED | OUTPATIENT
Start: 2019-06-02 | End: 2019-06-03 | Stop reason: HOSPADM

## 2019-06-02 RX ORDER — METHYLPREDNISOLONE SODIUM SUCCINATE 40 MG/ML
40 INJECTION, POWDER, LYOPHILIZED, FOR SOLUTION INTRAMUSCULAR; INTRAVENOUS EVERY 8 HOURS
Status: DISCONTINUED | OUTPATIENT
Start: 2019-06-02 | End: 2019-06-03 | Stop reason: HOSPADM

## 2019-06-02 RX ORDER — BISACODYL 10 MG
10 SUPPOSITORY, RECTAL RECTAL
Status: DISCONTINUED | OUTPATIENT
Start: 2019-06-02 | End: 2019-06-03 | Stop reason: HOSPADM

## 2019-06-02 RX ORDER — PROMETHAZINE HYDROCHLORIDE 25 MG/1
12.5-25 TABLET ORAL EVERY 4 HOURS PRN
Status: DISCONTINUED | OUTPATIENT
Start: 2019-06-02 | End: 2019-06-03 | Stop reason: HOSPADM

## 2019-06-02 RX ORDER — CLONIDINE HYDROCHLORIDE 0.1 MG/1
0.1 TABLET ORAL TWICE DAILY
Status: DISCONTINUED | OUTPATIENT
Start: 2019-06-02 | End: 2019-06-02

## 2019-06-02 RX ORDER — ONDANSETRON 4 MG/1
4 TABLET, ORALLY DISINTEGRATING ORAL EVERY 4 HOURS PRN
Status: DISCONTINUED | OUTPATIENT
Start: 2019-06-02 | End: 2019-06-03 | Stop reason: HOSPADM

## 2019-06-02 RX ORDER — LORAZEPAM 2 MG/1
4 TABLET ORAL
Status: DISCONTINUED | OUTPATIENT
Start: 2019-06-02 | End: 2019-06-03 | Stop reason: HOSPADM

## 2019-06-02 RX ORDER — LORAZEPAM 2 MG/ML
0.5 INJECTION INTRAMUSCULAR EVERY 4 HOURS PRN
Status: DISCONTINUED | OUTPATIENT
Start: 2019-06-02 | End: 2019-06-03 | Stop reason: HOSPADM

## 2019-06-02 RX ORDER — GUAIFENESIN/DEXTROMETHORPHAN 100-10MG/5
10 SYRUP ORAL EVERY 6 HOURS PRN
Status: DISCONTINUED | OUTPATIENT
Start: 2019-06-02 | End: 2019-06-03 | Stop reason: HOSPADM

## 2019-06-02 RX ORDER — LORAZEPAM 2 MG/ML
1 INJECTION INTRAMUSCULAR
Status: DISCONTINUED | OUTPATIENT
Start: 2019-06-02 | End: 2019-06-03 | Stop reason: HOSPADM

## 2019-06-02 RX ORDER — SODIUM CHLORIDE 9 MG/ML
1000 INJECTION, SOLUTION INTRAVENOUS ONCE
Status: COMPLETED | OUTPATIENT
Start: 2019-06-02 | End: 2019-06-02

## 2019-06-02 RX ORDER — GABAPENTIN 100 MG/1
100 CAPSULE ORAL EVERY 8 HOURS
Status: DISCONTINUED | OUTPATIENT
Start: 2019-06-02 | End: 2019-06-03 | Stop reason: HOSPADM

## 2019-06-02 RX ORDER — LORAZEPAM 2 MG/ML
1.5 INJECTION INTRAMUSCULAR
Status: DISCONTINUED | OUTPATIENT
Start: 2019-06-02 | End: 2019-06-03 | Stop reason: HOSPADM

## 2019-06-02 RX ORDER — LORAZEPAM 1 MG/1
0.5 TABLET ORAL EVERY 4 HOURS PRN
Status: DISCONTINUED | OUTPATIENT
Start: 2019-06-02 | End: 2019-06-03 | Stop reason: HOSPADM

## 2019-06-02 RX ORDER — POLYETHYLENE GLYCOL 3350 17 G/17G
1 POWDER, FOR SOLUTION ORAL
Status: DISCONTINUED | OUTPATIENT
Start: 2019-06-02 | End: 2019-06-03 | Stop reason: HOSPADM

## 2019-06-02 RX ORDER — OXYCODONE HYDROCHLORIDE 5 MG/1
2.5 TABLET ORAL
Status: DISCONTINUED | OUTPATIENT
Start: 2019-06-02 | End: 2019-06-03 | Stop reason: HOSPADM

## 2019-06-02 RX ORDER — MORPHINE SULFATE 4 MG/ML
2 INJECTION, SOLUTION INTRAMUSCULAR; INTRAVENOUS
Status: DISCONTINUED | OUTPATIENT
Start: 2019-06-02 | End: 2019-06-03 | Stop reason: HOSPADM

## 2019-06-02 RX ORDER — OXYCODONE HYDROCHLORIDE 5 MG/1
5 TABLET ORAL
Status: DISCONTINUED | OUTPATIENT
Start: 2019-06-02 | End: 2019-06-03 | Stop reason: HOSPADM

## 2019-06-02 RX ORDER — SODIUM CHLORIDE, SODIUM LACTATE, POTASSIUM CHLORIDE, CALCIUM CHLORIDE 600; 310; 30; 20 MG/100ML; MG/100ML; MG/100ML; MG/100ML
INJECTION, SOLUTION INTRAVENOUS CONTINUOUS
Status: DISCONTINUED | OUTPATIENT
Start: 2019-06-02 | End: 2019-06-03 | Stop reason: HOSPADM

## 2019-06-02 RX ORDER — VALPROIC ACID 250 MG/1
250 CAPSULE, LIQUID FILLED ORAL EVERY 8 HOURS
Status: DISCONTINUED | OUTPATIENT
Start: 2019-06-02 | End: 2019-06-03 | Stop reason: HOSPADM

## 2019-06-02 RX ADMIN — AMPICILLIN SODIUM AND SULBACTAM SODIUM 3 G: 2; 1 INJECTION, POWDER, FOR SOLUTION INTRAMUSCULAR; INTRAVENOUS at 17:56

## 2019-06-02 RX ADMIN — FOLIC ACID 1 MG: 1 TABLET ORAL at 18:05

## 2019-06-02 RX ADMIN — IOHEXOL 50 ML: 350 INJECTION, SOLUTION INTRAVENOUS at 11:41

## 2019-06-02 RX ADMIN — GABAPENTIN 100 MG: 100 CAPSULE ORAL at 22:26

## 2019-06-02 RX ADMIN — METHYLPREDNISOLONE SODIUM SUCCINATE 40 MG: 40 INJECTION, POWDER, FOR SOLUTION INTRAMUSCULAR; INTRAVENOUS at 22:28

## 2019-06-02 RX ADMIN — BUDESONIDE AND FORMOTEROL FUMARATE DIHYDRATE 2 PUFF: 160; 4.5 AEROSOL RESPIRATORY (INHALATION) at 22:30

## 2019-06-02 RX ADMIN — METHYLPREDNISOLONE SODIUM SUCCINATE 40 MG: 40 INJECTION, POWDER, FOR SOLUTION INTRAMUSCULAR; INTRAVENOUS at 17:52

## 2019-06-02 RX ADMIN — MAGNESIUM SULFATE IN WATER 2 G: 40 INJECTION, SOLUTION INTRAVENOUS at 18:45

## 2019-06-02 RX ADMIN — GABAPENTIN 100 MG: 100 CAPSULE ORAL at 17:55

## 2019-06-02 RX ADMIN — SODIUM CHLORIDE 1000 ML: 9 INJECTION, SOLUTION INTRAVENOUS at 10:20

## 2019-06-02 RX ADMIN — THERA TABS 1 TABLET: TAB at 18:08

## 2019-06-02 RX ADMIN — SODIUM CHLORIDE 1000 ML: 9 INJECTION, SOLUTION INTRAVENOUS at 17:40

## 2019-06-02 RX ADMIN — SODIUM CHLORIDE, POTASSIUM CHLORIDE, SODIUM LACTATE AND CALCIUM CHLORIDE: 600; 310; 30; 20 INJECTION, SOLUTION INTRAVENOUS at 17:51

## 2019-06-02 RX ADMIN — Medication 100 MG: at 18:05

## 2019-06-02 RX ADMIN — VALPROIC ACID 250 MG: 250 CAPSULE, LIQUID FILLED ORAL at 22:27

## 2019-06-02 RX ADMIN — VALPROIC ACID 250 MG: 250 CAPSULE, LIQUID FILLED ORAL at 17:55

## 2019-06-02 RX ADMIN — AMPICILLIN AND SULBACTAM 3 G: 2; 1 INJECTION, POWDER, FOR SOLUTION INTRAVENOUS at 13:35

## 2019-06-02 ASSESSMENT — ENCOUNTER SYMPTOMS
EYES NEGATIVE: 1
FOCAL WEAKNESS: 0
DEPRESSION: 0
HEARTBURN: 0
ORTHOPNEA: 0
WEAKNESS: 1
PALPITATIONS: 0
SHORTNESS OF BREATH: 1
CHILLS: 0
NERVOUS/ANXIOUS: 1
NAUSEA: 0
HEADACHES: 0
SPUTUM PRODUCTION: 1
DIZZINESS: 0
BACK PAIN: 0
FEVER: 0
MUSCULOSKELETAL NEGATIVE: 1
CARDIOVASCULAR NEGATIVE: 1
POLYDIPSIA: 0
VOMITING: 0
GASTROINTESTINAL NEGATIVE: 1
NECK PAIN: 0
COUGH: 0
BRUISES/BLEEDS EASILY: 0

## 2019-06-02 ASSESSMENT — LIFESTYLE VARIABLES
TOTAL SCORE: 1
AGITATION: NORMAL ACTIVITY
ANXIETY: NO ANXIETY (AT EASE)
ANXIETY: NO ANXIETY (AT EASE)
AUDITORY DISTURBANCES: NOT PRESENT
ORIENTATION AND CLOUDING OF SENSORIUM: ORIENTED AND CAN DO SERIAL ADDITIONS
TOTAL SCORE: 1
VISUAL DISTURBANCES: NOT PRESENT
PAROXYSMAL SWEATS: BARELY PERCEPTIBLE SWEATING. PALMS MOIST
HEADACHE, FULLNESS IN HEAD: NOT PRESENT
AGITATION: NORMAL ACTIVITY
NAUSEA AND VOMITING: NO NAUSEA AND NO VOMITING
VISUAL DISTURBANCES: NOT PRESENT
NAUSEA AND VOMITING: NO NAUSEA AND NO VOMITING
ORIENTATION AND CLOUDING OF SENSORIUM: ORIENTED AND CAN DO SERIAL ADDITIONS
HEADACHE, FULLNESS IN HEAD: NOT PRESENT
AGITATION: NORMAL ACTIVITY
NAUSEA AND VOMITING: NO NAUSEA AND NO VOMITING
ORIENTATION AND CLOUDING OF SENSORIUM: ORIENTED AND CAN DO SERIAL ADDITIONS
AUDITORY DISTURBANCES: NOT PRESENT
ANXIETY: NO ANXIETY (AT EASE)
EVER_SMOKED: YES
VISUAL DISTURBANCES: NOT PRESENT
SUBSTANCE_ABUSE: 1
PAROXYSMAL SWEATS: BARELY PERCEPTIBLE SWEATING. PALMS MOIST
TREMOR: NO TREMOR
PAROXYSMAL SWEATS: BARELY PERCEPTIBLE SWEATING. PALMS MOIST
AUDITORY DISTURBANCES: NOT PRESENT
EVER_SMOKED: YES
TOTAL SCORE: 1
TREMOR: NO TREMOR
TREMOR: NO TREMOR
HEADACHE, FULLNESS IN HEAD: NOT PRESENT

## 2019-06-02 ASSESSMENT — COGNITIVE AND FUNCTIONAL STATUS - GENERAL
SUGGESTED CMS G CODE MODIFIER MOBILITY: CH
SUGGESTED CMS G CODE MODIFIER DAILY ACTIVITY: CH
MOBILITY SCORE: 24
DAILY ACTIVITIY SCORE: 24

## 2019-06-02 ASSESSMENT — PATIENT HEALTH QUESTIONNAIRE - PHQ9
SUM OF ALL RESPONSES TO PHQ9 QUESTIONS 1 AND 2: 0
2. FEELING DOWN, DEPRESSED, IRRITABLE, OR HOPELESS: NOT AT ALL
1. LITTLE INTEREST OR PLEASURE IN DOING THINGS: NOT AT ALL

## 2019-06-02 ASSESSMENT — COPD QUESTIONNAIRES
DURING THE PAST 4 WEEKS HOW MUCH DID YOU FEEL SHORT OF BREATH: NONE/LITTLE OF THE TIME
HAVE YOU SMOKED AT LEAST 100 CIGARETTES IN YOUR ENTIRE LIFE: YES
DO YOU EVER COUGH UP ANY MUCUS OR PHLEGM?: NO/ONLY WITH OCCASIONAL COLDS OR INFECTIONS
COPD SCREENING SCORE: 2

## 2019-06-02 NOTE — ED PROVIDER NOTES
"ED Provider Note    CHIEF COMPLAINT  Chief Complaint   Patient presents with   • Suicidal Ideation     pt adria rolle from home, sent text message to friend telling her \"im over living\". arrived w/mulitple superficial laceration in bilateral upper extremity and old/healed scar in upper and lower extremities.   • Alcohol Intoxication       HPI  Mary Anne Valenzuela is a 28 y.o. female who presents with apparent alcohol intoxication.  She was brought in by EMS and upon contact with the patient, she was found to have multiple old and fresh superficial abrasions to bilateral upper extremities.    She is unable to contribute to the history given suspected alcohol intoxication.    REVIEW OF SYSTEMS  See HPI for further details.  Limited secondary to the patient's mental status per    PAST MEDICAL HISTORY   has a past medical history of Bronchitis (2014); Pain; and Rh negative state in antepartum period (3/17/2014).    SOCIAL HISTORY  Social History     Social History Main Topics   • Smoking status: Current Every Day Smoker     Packs/day: 2.00     Years: 9.00     Types: Cigarettes   • Smokeless tobacco: Never Used      Comment: 5-7 cigarettes per day    • Alcohol use Yes      Comment: 6 per week    • Drug use: No   • Sexual activity: Yes     Partners: Male     Birth control/ protection: Condom      Comment: No Birth Control       SURGICAL HISTORY   has a past surgical history that includes appendectomy (@ age 6); pelviscopy (Bilateral, 3/14/2016); hysteroscopy thermal ablation (N/A, 3/14/2016); lysis adhesions gyn (3/14/2016); hysterectomy laparoscopy (1/21/2017); and cystoscopy (1/21/2017).    CURRENT MEDICATIONS  Home Medications    **Home medications have not yet been reviewed for this encounter**         ALLERGIES  Allergies   Allergen Reactions   • Nkda [No Known Drug Allergy]        PHYSICAL EXAM  VITAL SIGNS: BP (!) 96/64   Pulse 100   Temp 36.5 °C (97.7 °F) (Temporal)   Resp 12   Wt 76 kg (167 lb 8.8 oz)   " SpO2 96%   BMI 26.24 kg/m²   Pulse ox interpretation: I interpret this pulse ox as normal.  Constitutional: Alert in no apparent distress.  HENT: No signs of trauma, Bilateral external ears normal, Nose normal.   Eyes: Pupils are equal and reactive, Conjunctiva normal, Non-icteric.   Neck: Normal range of motion, No tenderness, Supple, No stridor.   Cardiovascular: Regular rate and rhythm.   Thorax & Lungs: Normal breath sounds, No respiratory distress, No wheezing, No chest tenderness.   Abdomen: Bowel sounds normal, Soft, No tenderness, No masses, No pulsatile masses. No peritoneal signs.  Skin: Warm, Dry, No erythema, No rash.  Multiple superficial abrasions to bilateral forearms.  Multiple old scars to the bilateral upper and lower extremities.  Appears to be self-inflicted abrasions in various stages of healing.  No surrounding erythema or purulent drainage or active bleeding.  Back: No bony tenderness, No CVA tenderness.   Extremities: Intact distal pulses, No edema, No tenderness, No cyanosis  Musculoskeletal: Good range of motion in all major joints. No tenderness to palpation or major deformities noted.   Neurologic: Slurred speech, odor of alcohol, incoherent, Normal motor function and gait, Normal sensory function, No focal deficits noted.       DIAGNOSTIC STUDIES / PROCEDURES    LABS  Labs Reviewed   CBC WITH DIFFERENTIAL - Abnormal; Notable for the following:        Result Value    Hematocrit 47.4 (*)     MPV 8.8 (*)     Lymphocytes 43.10 (*)     All other components within normal limits   COMP METABOLIC PANEL - Abnormal; Notable for the following:     Sodium 146 (*)     Anion Gap 12.0 (*)     Glucose 110 (*)     AST(SGOT) 74 (*)     ALT(SGPT) 142 (*)     Alkaline Phosphatase 100 (*)     All other components within normal limits   SALICYLATE - Abnormal; Notable for the following:     Salicylates, Quant. 0 (*)     All other components within normal limits   DIAGNOSTIC ALCOHOL - Abnormal; Notable for the  "following:     Diagnostic Alcohol 0.49 (*)     All other components within normal limits   HCG QUAL SERUM   ACETAMINOPHEN   ESTIMATED GFR   URINE DRUG SCREEN       RADIOLOGY  No orders to display       COURSE & MEDICAL DECISION MAKING    Medications   haloperidol lactate (HALDOL) injection 5 mg (5 mg Intravenous Given 6/1/19 2222)   diphenhydrAMINE (BENADRYL) injection 25 mg (25 mg Intravenous Given 6/1/19 2222)       Pertinent Labs & Imaging studies reviewed. (See chart for details)  28 y.o. female presenting with suicidal ideation in the setting of alcohol intoxication.  Upon the patient's arrival, I am unable to obtain a clear history from her.  According to EMS, the patient sent a message to a friend saying that \"I am over living\".  She has multiple superficial lacerations to bilateral upper extremities that are concerning for self-inflicted wounds.  Multiple old scars on upper and lower extremities.    Because I was unable to obtain a coherent history from the patient, laboratory studies were performed.  It did confirm that the patient is likely altered secondary to alcohol intoxication.  Diagnostic alcohol level of 0.49.  No evidence of coingestions such as acetaminophen or salicylates.  The patient appears to be very intoxicated.  Will require significant amount of time to metabolize the alcohol in her system prior to life skills evaluation.    The patient was signed out to oncoming physician to follow-up on life skills recommendations regarding safe disposition for this patient.  I suspect that the patient may require inpatient management for further psychiatric stabilization should she continue with suicidal statements upon sobriety.    The patient was instructed to follow-up with primary care physician for further management.  To return immediately for any worsening symptoms or development of any other concerning signs or symptoms. The patient verbalizes understanding in their own words.    BP (!) 98/64   " Pulse 88   Temp 36.5 °C (97.7 °F) (Temporal)   Resp 20   Wt 76 kg (167 lb 8.8 oz)   SpO2 95%   BMI 26.24 kg/m²     The patient was referred to primary care where they will receive further BP management.      [facility], Emergency Dept  [address] [address] [phone]    As needed, If symptoms worsen    Primary care doctor    Schedule an appointment as soon as possible for a visit         FINAL IMPRESSION  1. Alcoholic intoxication with complication (HCC)    2. Suicidal ideation    3. Deliberate self-cutting            Electronically signed by: Parminder Del Angel, 6/1/2019 7:17 PM

## 2019-06-02 NOTE — ED NOTES
Patient remains somnolent at this time, but does arouse with painful stimuli.  She was repositioned for comfort and optimal breathing. O2 titrated down from 10-6L.

## 2019-06-02 NOTE — ED NOTES
Patient sleeping at this time. Equal chest rise and fall noted. Patient safety staff outside room for direct observation.

## 2019-06-02 NOTE — ED NOTES
Patient rounded on and visualized.  Respirations are even an unlabored. Patient repositions self as needed.

## 2019-06-02 NOTE — DISCHARGE PLANNING
Alert Team:  Re assessed patient after the BA was 0.08 and her information remains the same.  Patient does have a cough and the MD is checking for any concerns/ medical issues.  Patient told me it was because she smokes a lot.  MD will evaluate if medical clear can DC home and follow up with [facility] in AM.
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
## 2019-06-02 NOTE — ED NOTES
"Chief Complaint   Patient presents with   • Suicidal Ideation     pt adria aquilino from home, sent text message to friend telling her \"im over living\". arrived w/mulitple superficial laceration in bilateral upper extremity and old/healed scar in upper and lower extremities.   • Alcohol Intoxication     Sating 63% on ra upon arrival. Jaw thrust done upon arrival sats improved to 93%, oral suctioning done. Pt placed on 10 liters/oxymask.  Pt arouses on painful stimuli. Report to Irineo SALCEDO.  "

## 2019-06-02 NOTE — ED NOTES
Patient rounded on and visualized.  Respirations are even an unlabored. Patient repositions self as needed. Patient safety staff outside room.

## 2019-06-02 NOTE — ED PROVIDER NOTES
"ED Provider Note    CHIEF COMPLAINT  Chief Complaint   Patient presents with   • Suicidal Ideation     pt adria rolle from home, sent text message to friend telling her \"im over living\". arrived w/mulitple superficial laceration in bilateral upper extremity and old/healed scar in upper and lower extremities.   • Alcohol Intoxication       HPI  Mary Anne Valenzuela is a 28 y.o. female who presents for evaluation of altered mental status.  Patient presented last evening for altered mental status secondary to alcohol intoxication but was also exhibiting some suicidal ideation.  The patient did sustain self-inflicted superficial lacerations to her extremities.  The patient was seen last night by my partner.  The patient's care was transferred to me.  Patient was observed and was evaluated by [employer] who did not feel she required an involuntary committal under legal 2000.  When I assessed patient she also is not exhibiting any active suicidal ideation.  However, the patient was noted to be hypoxic and required supplemental oxygen to keep her pulse oximetry above 90.  I reassessed patient and she is not having any specific complaints.  She denies recent: Fever, chills, URI symptoms, cough, sputum, hemoptysis, vomiting, hematemesis, melena hematochezia.  No other acute symptomatology or complaints.    REVIEW OF SYSTEMS  See HPI for further details.  She denies a past history of: Hypertension, diabetes, thyroid dysfunction, seizures, cardia pulmonary disorders, gastrointestinal disorders, genitourinary disorders.  All other systems negative.    PAST MEDICAL HISTORY  Past Medical History:   Diagnosis Date   • Bronchitis 2014   • Pain     pelvis    • Rh negative state in antepartum period 3/17/2014       FAMILY HISTORY  Family History   Problem Relation Age of Onset   • Hypertension Father        SOCIAL HISTORY  Positive tobacco use; positive alcohol abuse; denies drug use;    SURGICAL HISTORY  Past Surgical History: "   Procedure Laterality Date   • HYSTERECTOMY LAPAROSCOPY  1/21/2017    Procedure: HYSTERECTOMY LAPAROSCOPY TOTAL  ;  Surgeon: Santo Walsh M.D.;  Location: SURGERY [facility];  Service:    • CYSTOSCOPY  1/21/2017    Procedure: CYSTOSCOPY;  Surgeon: Santo Walsh M.D.;  Location: SURGERY [facility];  Service:    • PELVISCOPY Bilateral 3/14/2016    Procedure: PELVISCOPY with Bilateral Salpingectomy ;  Surgeon: Santo Walsh M.D.;  Location: SURGERY SAME DAY [facility];  Service:    • HYSTEROSCOPY THERMAL ABLATION N/A 3/14/2016    Procedure: HYSTEROSCOPY THERMAL endometrial ABLATION, Cervical biopsy ;  Surgeon: Santo Walsh M.D.;  Location: SURGERY SAME DAY [facility];  Service:    • LYSIS ADHESIONS GYN  3/14/2016    Procedure: LYSIS ADHESIONS GYN;  Surgeon: Santo Walsh M.D.;  Location: SURGERY SAME DAY [facility];  Service:    • APPENDECTOMY  @ age 6    no complications or reactions to anesthesia       CURRENT MEDICATIONS  See nurse's notes    ALLERGIES  Allergies   Allergen Reactions   • Nkda [No Known Drug Allergy]        PHYSICAL EXAM  VITAL SIGNS: BP (!) 98/64   Pulse 80   Temp 36.5 °C (97.7 °F) (Temporal)   Resp 16   Wt 76 kg (167 lb 8.8 oz)   SpO2 95%   BMI 26.24 kg/m²    Constitutional: 28-year-old female, awake, oriented x3  HENT: Normocephalic, Atraumatic, Nares:Clear, Oropharynx: Mildly dry, posterior pharynx:clear   Eyes: PERRL, EOMI, Conjunctiva normal, No discharge.   Neck: Normal range of motion, No tenderness, Supple, No stridor.   Lymphatic: No lymphadenopathy noted.   Cardiovascular: Tachycardic rate and rhythm without mumurs, gallups, rubs   Thorax & Lungs: Bilateral rhonchi, No respiratory distress, No wheezing, no stridor, no rales. No chest tenderness.   Abdomen: Soft, nontender, nondistended, no organomegaly, positive bowel sounds normal in quality. No guarding or rebound.  Skin: Decreased skin turgor, pink, warm, dry. No rashes, petechiae, purpura. Normal  capillary refill.   Back: No tenderness, No CVA tenderness.   Extremities: Intact distal pulses, No edema, No tenderness, No cyanosis,  Vascular: Pulses are 2+, symmetric in the upper and lower extremities.  Musculoskeletal: Good range of motion in all major joints. No tenderness to palpation or major deformities noted.   Neurologic: Alert & oriented x 3,  No gross focal deficits noted.   Psychiatric: Affect normal, Judgment normal, Mood: No active suicidal or homicidal ideation      RADIOLOGY/PROCEDURES  CT-CTA CHEST PULMONARY ARTERY W/ RECONS   Final Result      1.  No CT evidence for pulmonary emboli.   2.  Apparent secretions within bilateral lower lobe bronchi may be due to aspiration or possibly developing pneumonia.   3.  Bibasilar atelectasis.   4.  Fatty infiltration of liver.            DX-CHEST-PORTABLE (1 VIEW)   Final Result      1.  Mild hypoinflation with elevation of LEFT hemidiaphragm.   2.  No pneumonia or pneumothorax.            COURSE & MEDICAL DECISION MAKING  Pertinent Labs & Imaging studies reviewed. (See chart for details)  1.  IV normal saline; IV fluids administered for hypotension with possible sepsis; oral rehydration not indicated in the setting of sepsis and hypertension; reevaluation revealed stable status;  2.  Monitor  3.  O2  4.  Unasyn    Laboratory studies: CBC differential within normal; beta-hCG is negative; diagnostic alcohol 0.49; salicylate 0; acetaminophen level less than 10; CMP shows sodium 146, anion gap 12, glucose 110, AST 74, , alk phosphatase 100 otherwise within normal; repeat breathalyzer 0.08;    Discussion/consultation: At this time, the patient presents for altered mental status.  The patient's altered mental status secondary to severe alcohol intoxication.  Patient was observed and was not exhibiting suicidal ideation.  However the patient was noted to be hypoxic and a work-up was performed.  D-dimer was positive but CT scanning showed no evidence of  pulmonary embolism.  However, patient showed findings consistent with developing aspiration pneumonia.  Therefore treatment for aspiration pneumonia potential sepsis was initiated.  I spoke with the hospitalist on-call.  Patient will be admitted for further monitoring, treatment, and care.    FINAL IMPRESSION  1. Altered mental status, unspecified altered mental status type    2. Alcoholic intoxication with complication (HCC)    3. Aspiration pneumonia of both lungs, unspecified aspiration pneumonia type, unspecified part of lung (HCC)    4. Hypoxia    5. Depression, unspecified depression type           PLAN  1.  Patient will be admitted for further monitoring, treatment, and care.    Electronically signed by: Guy G Gansert, 6/2/2019 12:34 PM

## 2019-06-02 NOTE — DISCHARGE INSTRUCTIONS
Discharge Instructions    Discharged to home by car with friend. Discharged via walking, hospital escort: Refused.  Special equipment needed: Not Applicable    Be sure to schedule a follow-up appointment with your primary care doctor or any specialists as instructed.     Discharge Plan:   Diet Plan: Discussed  Activity Level: Discussed  Smoking Cessation Offered: Patient Counseled  Confirmed Follow up Appointment: Patient to Call and Schedule Appointment  Confirmed Symptoms Management: Discussed  Medication Reconciliation Updated: Yes  Pneumococcal Vaccine Administered/Refused: Not given - Patient refused pneumococcal vaccine  Influenza Vaccine Indication: Patient Refuses    I understand that a diet low in cholesterol, fat, and sodium is recommended for good health. Unless I have been given specific instructions below for another diet, I accept this instruction as my diet prescription.   Other diet: regular    Special Instructions: None    · Is patient discharged on Warfarin / Coumadin?   No     Depression / Suicide Risk    As you are discharged from this [facility] facility, it is important to learn how to keep safe from harming yourself.    Recognize the warning signs:  · Abrupt changes in personality, positive or negative- including increase in energy   · Giving away possessions  · Change in eating patterns- significant weight changes-  positive or negative  · Change in sleeping patterns- unable to sleep or sleeping all the time   · Unwillingness or inability to communicate  · Depression  · Unusual sadness, discouragement and loneliness  · Talk of wanting to die  · Neglect of personal appearance   · Rebelliousness- reckless behavior  · Withdrawal from people/activities they love  · Confusion- inability to concentrate     If you or a loved one observes any of these behaviors or has concerns about self-harm, here's what you can do:  · Talk about it- your feelings and reasons for harming yourself  · Remove any  means that you might use to hurt yourself (examples: pills, rope, extension cords, firearm)  · Get professional help from the community (Mental Health, Substance Abuse, psychological counseling)  · Do not be alone:Call your Safe Contact- someone whom you trust who will be there for you.  · Call your local CRISIS HOTLINE 071-6192 or 096-159-9531  · Call your local Children's Mobile Crisis Response Team [place] (352) 813-7273 or [employer]  · Call the toll free National Suicide Prevention Hotlines   · National Suicide Prevention Lifeline 558-530-TGIJ (5644)  · National Hope Line Network 800-SUICIDE (011-4141)  Amoxicillin; Clavulanic Acid chewable tablets  What is this medicine?  AMOXICILLIN; CLAVULANIC ACID (a mox i MICHAEL in; MANGO pedraza ic AS id) is a penicillin antibiotic. It is used to treat certain kinds of bacterial infections. It It will not work for colds, flu, or other viral infections.  This medicine may be used for other purposes; ask your health care provider or pharmacist if you have questions.  COMMON BRAND NAME(S): Augmentin  What should I tell my health care provider before I take this medicine?  They need to know if you have any of these conditions:  -bowel disease, like colitis  -kidney disease  -liver disease  -mononucleosis  -phenylketonuria  -an unusual or allergic reaction to amoxicillin, penicillin, cephalosporin, other antibiotics, clavulanic acid, other medicines, foods, dyes, or preservatives  -pregnant or trying to get pregnant  -breast-feeding  How should I use this medicine?  Take this medicine by mouth. Chew it completely before swallowing. Follow the directions on the prescription label. Take this medicine at the start of a meal or snack. Take your medicine at regular intervals. Do not take your medicine more often than directed. Take all of your medicine as directed even if you think you are better. Do not skip doses or stop your medicine early.  Talk to your pediatrician  regarding the use of this medicine in children. While this drug may be prescribed for selected conditions, precautions do apply.  Overdosage: If you think you have taken too much of this medicine contact a poison control center or emergency room at once.  NOTE: This medicine is only for you. Do not share this medicine with others.  What if I miss a dose?  If you miss a dose, take it as soon as you can. If it is almost time for your next dose, take only that dose. Do not take double or extra doses.  What may interact with this medicine?  -allopurinol  -anticoagulants  -birth control pills  -methotrexate  -probenecid  This list may not describe all possible interactions. Give your health care provider a list of all the medicines, herbs, non-prescription drugs, or dietary supplements you use. Also tell them if you smoke, drink alcohol, or use illegal drugs. Some items may interact with your medicine.  What should I watch for while using this medicine?  Tell your doctor or health care professional if your symptoms do not improve.  Do not treat diarrhea with over the counter products. Contact your doctor if you have diarrhea that lasts more than 2 days or if it is severe and watery.  If you have diabetes, you may get a false-positive result for sugar in your urine. Check with your doctor or health care professional.  Birth control pills may not work properly while you are taking this medicine. Talk to your doctor about using an extra method of birth control.  What side effects may I notice from receiving this medicine?  Side effects that you should report to your doctor or health care professional as soon as possible:  -allergic reactions like skin rash, itching or hives, swelling of the face, lips, or tongue  -breathing problems  -dark urine  -fever or chills, sore throat  -redness, blistering, peeling or loosening of the skin, including inside the mouth  -seizures  -trouble passing urine or change in the amount of  urine  -unusual bleeding, bruising  -unusually weak or tired  -white patches or sores in the mouth or throat  Side effects that usually do not require medical attention (report to your doctor or health care professional if they continue or are bothersome):  -diarrhea  -dizziness  -headache  -nausea, vomiting  -stomach upset  -vaginal or anal irritation  This list may not describe all possible side effects. Call your doctor for medical advice about side effects. You may report side effects to FDA at 7-405-NQG-8441.  Where should I keep my medicine?  Keep out of the reach of children.  Store at room temperature below 25 degrees C (77 degrees F). Keep container tightly closed. Throw away any unused medicine after the expiration date.  NOTE: This sheet is a summary. It may not cover all possible information. If you have questions about this medicine, talk to your doctor, pharmacist, or health care provider.  © 2018 [employer] Standard (2009-03-12 11:38:22)    Guaifenesin oral ER tablets  What is this medicine?  GUAIFENESIN (gwye FEN e sin) is an expectorant. It helps to thin mucous and make coughs more productive. This medicine is used to treat coughs caused by colds or the flu. It is not intended to treat chronic cough caused by smoking, asthma, emphysema, or heart failure.  This medicine may be used for other purposes; ask your health care provider or pharmacist if you have questions.  COMMON BRAND NAME(S): Humibid, Mucinex  What should I tell my health care provider before I take this medicine?  They need to know if you have any of these conditions:  -fever  -kidney disease  -an unusual or allergic reaction to guaifenesin, other medicines, foods, dyes, or preservatives  -pregnant or trying to get pregnant  -breast-feeding  How should I use this medicine?  Take this medicine by mouth with a full glass of water. Follow the directions on the prescription label. Do not break, chew or crush this medicine. You may take  with food or on an empty stomach. Take your medicine at regular intervals. Do not take your medicine more often than directed.  Talk to your pediatrician regarding the use of this medicine in children. While this drug may be prescribed for children as young as 12 years old for selected conditions, precautions do apply.  Overdosage: If you think you have taken too much of this medicine contact a poison control center or emergency room at once.  NOTE: This medicine is only for you. Do not share this medicine with others.  What if I miss a dose?  If you miss a dose, take it as soon as you can. If it is almost time for your next dose, take only that dose. Do not take double or extra doses.  What may interact with this medicine?  Interactions are not expected.  This list may not describe all possible interactions. Give your health care provider a list of all the medicines, herbs, non-prescription drugs, or dietary supplements you use. Also tell them if you smoke, drink alcohol, or use illegal drugs. Some items may interact with your medicine.  What should I watch for while using this medicine?  Do not treat a cough for more than 1 week without consulting your doctor or health care professional. If you also have a high fever, skin rash, continuing headache, or sore throat, see your doctor.  For best results, drink 6 to 8 glasses water daily while you are taking this medicine.  What side effects may I notice from receiving this medicine?  Side effects that you should report to your doctor or health care professional as soon as possible:  -allergic reactions like skin rash, itching or hives, swelling of the face, lips, or tongue  Side effects that usually do not require medical attention (report to your doctor or health care professional if they continue or are bothersome):  -dizziness  -headache  -stomach upset  This list may not describe all possible side effects. Call your doctor for medical advice about side effects.  You may report side effects to FDA at 5-812-WBQ-3843.  Where should I keep my medicine?  Keep out of the reach of children.  Store at room temperature between 20 and 25 degrees C (68 and 77 degrees F). Keep container tightly closed. Throw away any unused medicine after the expiration date.  NOTE: This sheet is a summary. It may not cover all possible information. If you have questions about this medicine, talk to your doctor, pharmacist, or health care provider.  © 2018 [employer]/Gold Standard (2009-04-29 12:14:14)

## 2019-06-03 ENCOUNTER — APPOINTMENT (OUTPATIENT)
Dept: CARDIOLOGY | Facility: MEDICAL CENTER | Age: 29
DRG: 604 | End: 2019-06-03
Attending: HOSPITALIST
Payer: MEDICAID

## 2019-06-03 VITALS
WEIGHT: 156.09 LBS | SYSTOLIC BLOOD PRESSURE: 125 MMHG | HEART RATE: 91 BPM | TEMPERATURE: 98.9 F | BODY MASS INDEX: 24.45 KG/M2 | RESPIRATION RATE: 15 BRPM | DIASTOLIC BLOOD PRESSURE: 78 MMHG | OXYGEN SATURATION: 91 %

## 2019-06-03 LAB
ALBUMIN SERPL BCP-MCNC: 4 G/DL (ref 3.2–4.9)
ALBUMIN/GLOB SERPL: 1.6 G/DL
ALP SERPL-CCNC: 76 U/L (ref 30–99)
ALT SERPL-CCNC: 126 U/L (ref 2–50)
ANION GAP SERPL CALC-SCNC: 10 MMOL/L (ref 0–11.9)
AST SERPL-CCNC: 59 U/L (ref 12–45)
BASOPHILS # BLD AUTO: 0.4 % (ref 0–1.8)
BASOPHILS # BLD: 0.05 K/UL (ref 0–0.12)
BILIRUB SERPL-MCNC: 1.3 MG/DL (ref 0.1–1.5)
BUN SERPL-MCNC: 13 MG/DL (ref 8–22)
CALCIUM SERPL-MCNC: 9 MG/DL (ref 8.5–10.5)
CHLORIDE SERPL-SCNC: 101 MMOL/L (ref 96–112)
CO2 SERPL-SCNC: 25 MMOL/L (ref 20–33)
CREAT SERPL-MCNC: 0.74 MG/DL (ref 0.5–1.4)
EKG IMPRESSION: NORMAL
EOSINOPHIL # BLD AUTO: 0 K/UL (ref 0–0.51)
EOSINOPHIL NFR BLD: 0 % (ref 0–6.9)
ERYTHROCYTE [DISTWIDTH] IN BLOOD BY AUTOMATED COUNT: 41.4 FL (ref 35.9–50)
GLOBULIN SER CALC-MCNC: 2.5 G/DL (ref 1.9–3.5)
GLUCOSE SERPL-MCNC: 158 MG/DL (ref 65–99)
HCT VFR BLD AUTO: 43.8 % (ref 37–47)
HGB BLD-MCNC: 15 G/DL (ref 12–16)
IMM GRANULOCYTES # BLD AUTO: 0.07 K/UL (ref 0–0.11)
IMM GRANULOCYTES NFR BLD AUTO: 0.6 % (ref 0–0.9)
LV EJECT FRACT  99904: 65
LV EJECT FRACT MOD 2C 99903: 60.66
LV EJECT FRACT MOD 4C 99902: 70.36
LV EJECT FRACT MOD BP 99901: 67.85
LYMPHOCYTES # BLD AUTO: 0.83 K/UL (ref 1–4.8)
LYMPHOCYTES NFR BLD: 7.1 % (ref 22–41)
MAGNESIUM SERPL-MCNC: 2 MG/DL (ref 1.5–2.5)
MCH RBC QN AUTO: 32.1 PG (ref 27–33)
MCHC RBC AUTO-ENTMCNC: 34.2 G/DL (ref 33.6–35)
MCV RBC AUTO: 93.6 FL (ref 81.4–97.8)
MONOCYTES # BLD AUTO: 0.14 K/UL (ref 0–0.85)
MONOCYTES NFR BLD AUTO: 1.2 % (ref 0–13.4)
NEUTROPHILS # BLD AUTO: 10.62 K/UL (ref 2–7.15)
NEUTROPHILS NFR BLD: 90.7 % (ref 44–72)
NRBC # BLD AUTO: 0 K/UL
NRBC BLD-RTO: 0 /100 WBC
PHOSPHATE SERPL-MCNC: 2.6 MG/DL (ref 2.5–4.5)
PLATELET # BLD AUTO: 286 K/UL (ref 164–446)
PMV BLD AUTO: 9.3 FL (ref 9–12.9)
POTASSIUM SERPL-SCNC: 3.9 MMOL/L (ref 3.6–5.5)
PROT SERPL-MCNC: 6.5 G/DL (ref 6–8.2)
RBC # BLD AUTO: 4.68 M/UL (ref 4.2–5.4)
SODIUM SERPL-SCNC: 136 MMOL/L (ref 135–145)
WBC # BLD AUTO: 11.7 K/UL (ref 4.8–10.8)

## 2019-06-03 PROCEDURE — 83735 ASSAY OF MAGNESIUM: CPT

## 2019-06-03 PROCEDURE — 84100 ASSAY OF PHOSPHORUS: CPT

## 2019-06-03 PROCEDURE — 700102 HCHG RX REV CODE 250 W/ 637 OVERRIDE(OP): Performed by: HOSPITALIST

## 2019-06-03 PROCEDURE — 99239 HOSP IP/OBS DSCHRG MGMT >30: CPT | Performed by: HOSPITALIST

## 2019-06-03 PROCEDURE — 85025 COMPLETE CBC W/AUTO DIFF WBC: CPT

## 2019-06-03 PROCEDURE — 80053 COMPREHEN METABOLIC PANEL: CPT

## 2019-06-03 PROCEDURE — 700105 HCHG RX REV CODE 258: Performed by: HOSPITALIST

## 2019-06-03 PROCEDURE — 36415 COLL VENOUS BLD VENIPUNCTURE: CPT

## 2019-06-03 PROCEDURE — 99231 SBSQ HOSP IP/OBS SF/LOW 25: CPT | Mod: GC | Performed by: INTERNAL MEDICINE

## 2019-06-03 PROCEDURE — 700111 HCHG RX REV CODE 636 W/ 250 OVERRIDE (IP): Performed by: HOSPITALIST

## 2019-06-03 PROCEDURE — A9270 NON-COVERED ITEM OR SERVICE: HCPCS | Performed by: HOSPITALIST

## 2019-06-03 PROCEDURE — 93306 TTE W/DOPPLER COMPLETE: CPT

## 2019-06-03 PROCEDURE — 93306 TTE W/DOPPLER COMPLETE: CPT | Mod: 26 | Performed by: INTERNAL MEDICINE

## 2019-06-03 RX ORDER — ALBUTEROL SULFATE 90 UG/1
2 AEROSOL, METERED RESPIRATORY (INHALATION) EVERY 6 HOURS PRN
Qty: 8.5 G | Refills: 0 | Status: SHIPPED | OUTPATIENT
Start: 2019-06-03

## 2019-06-03 RX ORDER — AMOXICILLIN AND CLAVULANATE POTASSIUM 875; 125 MG/1; MG/1
1 TABLET, FILM COATED ORAL 2 TIMES DAILY
Qty: 14 TAB | Refills: 0 | Status: SHIPPED | OUTPATIENT
Start: 2019-06-03 | End: 2019-06-10

## 2019-06-03 RX ORDER — GUAIFENESIN 600 MG/1
600 TABLET, EXTENDED RELEASE ORAL EVERY 12 HOURS
Qty: 14 TAB | Refills: 0 | Status: SHIPPED | OUTPATIENT
Start: 2019-06-03 | End: 2019-06-10

## 2019-06-03 RX ADMIN — THERA TABS 1 TABLET: TAB at 05:23

## 2019-06-03 RX ADMIN — METHYLPREDNISOLONE SODIUM SUCCINATE 40 MG: 40 INJECTION, POWDER, FOR SOLUTION INTRAMUSCULAR; INTRAVENOUS at 05:26

## 2019-06-03 RX ADMIN — VALPROIC ACID 250 MG: 250 CAPSULE, LIQUID FILLED ORAL at 05:23

## 2019-06-03 RX ADMIN — GABAPENTIN 100 MG: 100 CAPSULE ORAL at 05:24

## 2019-06-03 RX ADMIN — FOLIC ACID 1 MG: 1 TABLET ORAL at 05:24

## 2019-06-03 RX ADMIN — Medication 100 MG: at 05:23

## 2019-06-03 RX ADMIN — BUDESONIDE AND FORMOTEROL FUMARATE DIHYDRATE 2 PUFF: 160; 4.5 AEROSOL RESPIRATORY (INHALATION) at 12:30

## 2019-06-03 RX ADMIN — AMPICILLIN SODIUM AND SULBACTAM SODIUM 3 G: 2; 1 INJECTION, POWDER, FOR SOLUTION INTRAMUSCULAR; INTRAVENOUS at 00:56

## 2019-06-03 RX ADMIN — AMPICILLIN SODIUM AND SULBACTAM SODIUM 3 G: 2; 1 INJECTION, POWDER, FOR SOLUTION INTRAMUSCULAR; INTRAVENOUS at 05:24

## 2019-06-03 ASSESSMENT — LIFESTYLE VARIABLES
AGITATION: NORMAL ACTIVITY
ANXIETY: NO ANXIETY (AT EASE)
VISUAL DISTURBANCES: NOT PRESENT
TOTAL SCORE: 1
NAUSEA AND VOMITING: NO NAUSEA AND NO VOMITING
ANXIETY: NO ANXIETY (AT EASE)
TREMOR: NO TREMOR
PAROXYSMAL SWEATS: BARELY PERCEPTIBLE SWEATING. PALMS MOIST
AUDITORY DISTURBANCES: NOT PRESENT
AGITATION: NORMAL ACTIVITY
ORIENTATION AND CLOUDING OF SENSORIUM: ORIENTED AND CAN DO SERIAL ADDITIONS
TREMOR: NO TREMOR
VISUAL DISTURBANCES: NOT PRESENT
AUDITORY DISTURBANCES: NOT PRESENT
HEADACHE, FULLNESS IN HEAD: NOT PRESENT
NAUSEA AND VOMITING: NO NAUSEA AND NO VOMITING
TOTAL SCORE: 1
HEADACHE, FULLNESS IN HEAD: NOT PRESENT
ORIENTATION AND CLOUDING OF SENSORIUM: ORIENTED AND CAN DO SERIAL ADDITIONS
PAROXYSMAL SWEATS: BARELY PERCEPTIBLE SWEATING. PALMS MOIST

## 2019-06-03 NOTE — PROGRESS NOTES
2 RN skin check with Shukri RN    Bilat ears pink and blanching  Many scars on all 4 extremities   New cuts on bilat upper extremities     All other skin intact and blanching , pictures taken and uploaded, wound consulted

## 2019-06-03 NOTE — H&P
Hospital Medicine History & Physical Note    Date of Service  6/2/2019    Primary Care Physician  Pcp Pt States None    Consultants  Cardiology  Psychiatry    Code Status  Full code    Chief Complaint  Shortness of breath, status post alcohol intoxication    History of Presenting Illness  28 y.o. female who presented 6/1/2019 with initial alcohol intoxication and self-mutilation with possible suicidal attempt, in a patient with a history of migraine, PTSD, alcoholism.  The patient was treated in the emergency room and cleared her intoxication, she was seen by life skills and was to be discharged to community resources, the patient then found to be hypoxic and oxygen dependent, work-up revealed a likely aspiration pneumonia or pneumonitis, the patient therefore to be admitted for further work-up, the patient does have a significant cough without significant sputum production though.  On telemetry the patient found with a second-degree AV block type II, denied history of same.  Patient is a chronic alcoholic with ongoing tobacco abuse, denies drug use, is unemployed, single.    Review of Systems  Review of Systems   Constitutional: Positive for malaise/fatigue. Negative for chills and fever.   HENT: Negative.    Eyes: Negative.    Respiratory: Positive for sputum production and shortness of breath. Negative for cough.    Cardiovascular: Negative.  Negative for chest pain, palpitations and orthopnea.   Gastrointestinal: Negative.  Negative for heartburn, nausea and vomiting.   Genitourinary: Negative.  Negative for dysuria and frequency.   Musculoskeletal: Negative.  Negative for back pain and neck pain.   Skin: Negative.  Negative for itching and rash.   Neurological: Positive for weakness. Negative for dizziness, focal weakness and headaches.   Endo/Heme/Allergies: Negative.  Negative for polydipsia. Does not bruise/bleed easily.   Psychiatric/Behavioral: Positive for substance abuse and suicidal ideas. Negative for  depression. The patient is nervous/anxious.        Past Medical History   has a past medical history of Bronchitis (2014); Pain; and Rh negative state in antepartum period (3/17/2014).    Surgical History   has a past surgical history that includes appendectomy (@ age 6); pelviscopy (Bilateral, 3/14/2016); hysteroscopy thermal ablation (N/A, 3/14/2016); lysis adhesions gyn (3/14/2016); hysterectomy laparoscopy (1/21/2017); and cystoscopy (1/21/2017).     Family History  family history includes Hypertension in her father.     Social History   reports that she has been smoking Cigarettes.  She has a 18.00 pack-year smoking history. She has never used smokeless tobacco. She reports that she drinks alcohol. She reports that she does not use drugs.    Allergies  Allergies   Allergen Reactions   • Nkda [No Known Drug Allergy]        Medications  None       Physical Exam  Temp:  [36.2 °C (97.2 °F)-36.8 °C (98.3 °F)] 36.2 °C (97.2 °F)  Pulse:  [] 81  Resp:  [15-25] 16  BP: ()/(64-72) 94/69  SpO2:  [87 %-98 %] 97 %    Physical Exam   Constitutional: She is oriented to person, place, and time. She appears well-developed and well-nourished.   HENT:   Head: Normocephalic and atraumatic.   Nose: Nose normal.   Mouth/Throat: Oropharynx is clear and moist.   Eyes: Pupils are equal, round, and reactive to light. Conjunctivae and EOM are normal.   Neck: Normal range of motion. Neck supple. No JVD present. No thyromegaly present.   Cardiovascular: Normal rate, regular rhythm, normal heart sounds and intact distal pulses.  Exam reveals no gallop and no friction rub.    Capillary refill <3 secs   Pulmonary/Chest: Effort normal and breath sounds normal. She has no wheezes. She has no rales.   Abdominal: Soft. Bowel sounds are normal. She exhibits no distension and no mass. There is no tenderness. There is no rebound and no guarding.   Musculoskeletal: Normal range of motion. She exhibits no edema or tenderness.    Lymphadenopathy:     She has no cervical adenopathy.   Neurological: She is alert and oriented to person, place, and time. No cranial nerve deficit.   Skin: Skin is warm and dry. She is not diaphoretic. No cyanosis.   Psychiatric: Her affect is blunt. Her speech is delayed. She is slowed. She expresses inappropriate judgment.   Nursing note and vitals reviewed.      Laboratory:  Recent Labs      06/01/19 1956   WBC  7.0   RBC  5.05   HEMOGLOBIN  16.0   HEMATOCRIT  47.4*   MCV  93.9   MCH  31.7   MCHC  33.8   RDW  43.8   PLATELETCT  325   MPV  8.8*     Recent Labs      06/01/19 1956   SODIUM  146*   POTASSIUM  3.9   CHLORIDE  111   CO2  23   GLUCOSE  110*   BUN  13   CREATININE  0.86   CALCIUM  8.9     Recent Labs      06/01/19 1956   ALTSGPT  142*   ASTSGOT  74*   ALKPHOSPHAT  100*   TBILIRUBIN  0.4   GLUCOSE  110*                 No results for input(s): TROPONINI in the last 72 hours.    Urinalysis:    No results found     Imaging:  CT-CTA CHEST PULMONARY ARTERY W/ RECONS   Final Result      1.  No CT evidence for pulmonary emboli.   2.  Apparent secretions within bilateral lower lobe bronchi may be due to aspiration or possibly developing pneumonia.   3.  Bibasilar atelectasis.   4.  Fatty infiltration of liver.            DX-CHEST-PORTABLE (1 VIEW)   Final Result      1.  Mild hypoinflation with elevation of LEFT hemidiaphragm.   2.  No pneumonia or pneumothorax.      EC-ECHOCARDIOGRAM COMPLETE W/O CONT    (Results Pending)         Assessment/Plan:  I anticipate this patient will require at least two midnights for appropriate medical management, necessitating inpatient admission.    Second degree AV block, Mobitz type II   Assessment & Plan    Incidental finding while on telemetry, cardiology consultation  Echocardiogram  Balance electrolytes, additional magnesium, check thyroid function     Aspiration pneumonia (HCC)   Assessment & Plan    Possibly secondary to intoxication, the patient replaced on  empiric antibiotics, close monitoring  Wean oxygen     Alcohol intoxication (HCC)   Assessment & Plan    Patient status post evaluation in the emergency room, significant risk for alcohol withdrawal syndrome  Placed on CIWA protocol and [place] modify protocol  Close observation, telemetry monitoring     PTSD (post-traumatic stress disorder)   Assessment & Plan    Was seen by life skills, refer to their assessment     Tobacco abuse   Assessment & Plan    Cessation discussed     Tachycardia   Assessment & Plan    Unclear if related to dehydration and/or alcohol withdrawal       Self-mutilation   Assessment & Plan    With cutting of bilateral upper extremities  The patient was seen by life skills in the emergency room and is released for community treatment for her PTSD     Plan  Start empiric antibiotics for pneumonia  Telemetry admit for CIWA protocol  Work-up for AV block, cardiology eval  Hemodynamic support, multivitamin support,  CIWA protocol and modified Ezequiel protocol  Hopefully avoiding full alcohol delirium  Close monitoring and supportive care  Acute complex, high risk      VTE prophylaxis: Lovenox

## 2019-06-03 NOTE — ASSESSMENT & PLAN NOTE
Patient status post evaluation in the emergency room, significant risk for alcohol withdrawal syndrome  Placed on CIWA protocol and Ezequiel modify protocol  Close observation, telemetry monitoring

## 2019-06-03 NOTE — PROGRESS NOTES
Patient discharged home with friend. Provided discharge instructions and prescriptions information. IV removed, patient tolerated well.  Tele box removed.  Left unit via walking without incident.

## 2019-06-03 NOTE — CARE PLAN
Problem: Communication  Goal: The ability to communicate needs accurately and effectively will improve  Outcome: PROGRESSING SLOWER THAN EXPECTED  Communication board updated. All pt questions answered at this time and no further questions. Pt encouraged to voice feelings and ask questions as they arise.       Problem: Safety  Goal: Will remain free from injury  Outcome: PROGRESSING AS EXPECTED  Patient educated about risk of falls and reasoning for use of tread socks, calling for help, and bed alarms.

## 2019-06-03 NOTE — CONSULTS
Cardiology Consult Note:    Quinn To  Date & Time note created:    6/2/2019   11:16 PM     Referring MD:  Dr. Kendall    Patient ID:   Name:             Mary Anne Valenzuela   YOB: 1990  Age:                 28 y.o.  female   MRN:               [id number]                                                             Chief Complaint / Reason for consult:  Second-degree AV block on telemetry.    History of Present Illness:    This is a 28 years old women with alcohol intoxication, suicidal attempts, self-mutilation, presented to the hospital with alcohol intoxication and laceration on her left arm.  Cardiology is been consulted due to finding of second-degree heart block type II on telemetry monitoring.  Patient is completely asymptomatic.  No prior history of syncopal episodes or syncope.  Patient does not have lightheadedness.  No family history of sudden cardiac death.    Patient is a poor historian however.    No prior cardiac work-up.  No prior invasive cardiac procedure or surgery.  No childhood cardiac problems.    I personally interpreted her baseline EKG tracing which shows sinus rhythm without significant malignant arrhythmias or heart block.    I also personally interpreted the telemetry tracing which showed evidence of drop P wave consistent with Mobitz type II AV heart block.      Review of Systems:      Constitutional: Denies fevers, Denies weight changes  Eyes: Denies changes in vision, no eye pain  Ears/Nose/Throat/Mouth: Denies nasal congestion or sore throat   Cardiovascular: non chest pain, no palpitations   Respiratory: no shortness of breath , Denies cough  Gastrointestinal/Hepatic: Denies abdominal pain, nausea, vomiting, diarrhea, constipation or GI bleeding   Genitourinary: Denies dysuria or frequency  Musculoskeletal/Rheum: Denies  joint pain and swelling   Skin: Denies rash  Neurological: Denies headache, confusion, memory loss or focal  weakness/parasthesias  Psychiatric: denies mood disorder   Endocrine: [place] thyroid problems  Heme/Oncology/Lymph Nodes: Denies enlarged lymph nodes, denies brusing or known bleeding disorder  All other systems were reviewed and are negative (AMA/CMS criteria)                Past Medical History:   Past Medical History:   Diagnosis Date   • Bronchitis 2014   • Pain     pelvis    • Rh negative state in antepartum period 3/17/2014     Active Hospital Problems    Diagnosis   • Alcohol intoxication (HCC) [F10.929]     Priority: High   • Aspiration pneumonia (HCC) [J69.0]     Priority: High   • Second degree AV block, Mobitz type II [I44.1]     Priority: High   • Self-mutilation [Z72.89]   • Tachycardia [R00.0]   • Tobacco abuse [Z72.0]   • PTSD (post-traumatic stress disorder) [F43.10]       Past Surgical History:  Past Surgical History:   Procedure Laterality Date   • HYSTERECTOMY LAPAROSCOPY  1/21/2017    Procedure: HYSTERECTOMY LAPAROSCOPY TOTAL  ;  Surgeon: Santo Walsh M.D.;  Location: SURGERY [facility];  Service:    • CYSTOSCOPY  1/21/2017    Procedure: CYSTOSCOPY;  Surgeon: Santo Walsh M.D.;  Location: SURGERY [facility];  Service:    • PELVISCOPY Bilateral 3/14/2016    Procedure: PELVISCOPY with Bilateral Salpingectomy ;  Surgeon: Santo Walsh M.D.;  Location: SURGERY SAME DAY [facility];  Service:    • HYSTEROSCOPY THERMAL ABLATION N/A 3/14/2016    Procedure: HYSTEROSCOPY THERMAL endometrial ABLATION, Cervical biopsy ;  Surgeon: Santo Walsh M.D.;  Location: SURGERY SAME DAY [facility];  Service:    • LYSIS ADHESIONS GYN  3/14/2016    Procedure: LYSIS ADHESIONS GYN;  Surgeon: Santo Walsh M.D.;  Location: SURGERY SAME DAY [facility];  Service:    • APPENDECTOMY  @ age 6    no complications or reactions to anesthesia       Hospital Medications:    Current Facility-Administered Medications:   •  senna-docusate (PERICOLACE or SENOKOT S) 8.6-50 MG per tablet 2 Tab, 2 Tab,  Oral, BID **AND** polyethylene glycol/lytes (MIRALAX) PACKET 1 Packet, 1 Packet, Oral, QDAY PRN **AND** magnesium hydroxide (MILK OF MAGNESIA) suspension 30 mL, 30 mL, Oral, QDAY PRN **AND** bisacodyl (DULCOLAX) suppository 10 mg, 10 mg, Rectal, QDAY PRN, Emeterio Ferrara M.D.  •  Respiratory Care per Protocol, , Nebulization, Continuous RT, Emeterio Ferrara M.D.  •  lactated ringers infusion, , Intravenous, Continuous, Emeterio Ferrara M.D., Last Rate: 83 mL/hr at 06/02/19 1751  •  enoxaparin (LOVENOX) inj 40 mg, 40 mg, Subcutaneous, DAILY, Emeterio Ferrara M.D.  •  acetaminophen (TYLENOL) tablet 650 mg, 650 mg, Oral, Q6HRS PRN, Emeterio Ferrara M.D.  •  Notify provider if pain remains uncontrolled, , , CONTINUOUS **AND** Use the numeric rating scale (NRS-11) on regular floors and Critical-Care Pain Observation Tool (CPOT) on ICUs/Trauma to assess pain, , , CONTINUOUS **AND** Pulse Ox (Oximetry), , , CONTINUOUS **AND** Pharmacy Consult Request ...Pain Management Review 1 Each, 1 Each, Other, PHARMACY TO DOSE **AND** If patient difficult to arouse and/or has respiratory depression, stop any opiates that are currently infusing and call a Rapid Response., , , CONTINUOUS **AND** oxyCODONE immediate-release (ROXICODONE) tablet 2.5 mg, 2.5 mg, Oral, Q3HRS PRN **AND** oxyCODONE immediate-release (ROXICODONE) tablet 5 mg, 5 mg, Oral, Q3HRS PRN **AND** morphine (pf) 4 mg/ml injection 2 mg, 2 mg, Intravenous, Q3HRS PRN, Emeterio Ferrara M.D.  •  ampicillin/sulbactam (UNASYN) 3 g in  mL IVPB, 3 g, Intravenous, Q6HRS, Emeterio Ferrara M.D., Stopped at 06/02/19 1826  •  ondansetron (ZOFRAN) syringe/vial injection 4 mg, 4 mg, Intravenous, Q4HRS PRN, Emeterio Ferrara M.D.  •  ondansetron (ZOFRAN ODT) dispertab 4 mg, 4 mg, Oral, Q4HRS PRN, Emeterio Ferrara M.D.  •  promethazine (PHENERGAN) tablet 12.5-25 mg, 12.5-25 mg, Oral, Q4HRS PRN, Emeterio Ferrara M.D.  •  promethazine (PHENERGAN)  suppository 12.5-25 mg, 12.5-25 mg, Rectal, Q4HRS PRN, Emeterio Ferrara M.D.  •  prochlorperazine (COMPAZINE) injection 5-10 mg, 5-10 mg, Intravenous, Q4HRS PRN, Emeterio Ferrara M.D.  •  LORazepam (ATIVAN) tablet 0.5 mg, 0.5 mg, Oral, Q4HRS PRN, Emeterio Ferrara M.D.  •  LORazepam (ATIVAN) tablet 1 mg, 1 mg, Oral, Q4HRS PRN **OR** LORazepam (ATIVAN) injection 0.5 mg, 0.5 mg, Intravenous, Q4HRS PRN, Emeterio Ferrara M.D.  •  LORazepam (ATIVAN) tablet 2 mg, 2 mg, Oral, Q2HRS PRN **OR** LORazepam (ATIVAN) injection 1 mg, 1 mg, Intravenous, Q2HRS PRN, Emeterio Ferrara M.D.  •  LORazepam (ATIVAN) tablet 3 mg, 3 mg, Oral, Q HOUR PRN **OR** LORazepam (ATIVAN) injection 1.5 mg, 1.5 mg, Intravenous, Q HOUR PRN, Emeterio Ferrara M.D.  •  LORazepam (ATIVAN) tablet 4 mg, 4 mg, Oral, Q15 MIN PRN **OR** LORazepam (ATIVAN) injection 2 mg, 2 mg, Intravenous, Q15 MIN PRN, Emeterio Ferrara M.D.  •  thiamine tablet 100 mg, 100 mg, Oral, DAILY, 100 mg at 06/02/19 1805 **AND** multivitamin (THERAGRAN) tablet 1 Tab, 1 Tab, Oral, DAILY, 1 Tab at 06/02/19 1808 **AND** folic acid (FOLVITE) tablet 1 mg, 1 mg, Oral, DAILY, Emeterio Ferrara M.D., 1 mg at 06/02/19 1805  •  guaiFENesin dextromethorphan (ROBITUSSIN DM) 100-10 MG/5ML syrup 10 mL, 10 mL, Oral, Q6HRS PRN, Emeterio Ferrara M.D.  •  valproic acid (DEPAKENE) capsule 250 mg, 250 mg, Oral, Q8HRS, Emeterio Ferrara M.D., 250 mg at 06/02/19 2227  •  gabapentin (NEURONTIN) capsule 100 mg, 100 mg, Oral, Q8HRS, Emeterio Ferrara M.D., 100 mg at 06/02/19 2226  •  methylPREDNISolone (SOLU-MEDROL) 40 MG injection 40 mg, 40 mg, Intravenous, Q8HRS, Emeterio Ferrara M.D., 40 mg at 06/02/19 2228  •  budesonide-formoterol (SYMBICORT) 160-4.5 MCG/ACT inhaler 2 Puff, 2 Puff, Inhalation, BID (RT), Emeterio Ferrara M.D., 2 Puff at 06/02/19 2230    Current Outpatient Medications:  No prescriptions prior to admission.       Medication Allergy:  Allergies   Allergen  Reactions   • Nkda [No Known Drug Allergy]        Family History:  Family History   Problem Relation Age of Onset   • Hypertension Father        Social History:  Social History     Social History   • Marital status: Single     Spouse name: N/A   • Number of children: N/A   • Years of education: N/A     Occupational History   • Not on file.     Social History Main Topics   • Smoking status: Current Every Day Smoker     Packs/day: 2.00     Years: 9.00     Types: Cigarettes   • Smokeless tobacco: Never Used      Comment: 5-7 cigarettes per day    • Alcohol use Yes      Comment: 6 per week    • Drug use: No   • Sexual activity: Yes     Partners: Male     Birth control/ protection: Condom      Comment: No Birth Control     Other Topics Concern   • Not on file     Social History Narrative   • No narrative on file         Physical Exam:  Vitals/ General Appearance:   Weight/BMI: Body mass index is 24.45 kg/m².  /76   Pulse 76   Temp 36 °C (96.8 °F) (Temporal)   Resp 18   Wt 70.8 kg (156 lb 1.4 oz)   SpO2 96%   Vitals:    06/02/19 1440 06/02/19 1600 06/02/19 1857 06/02/19 2022   BP: 106/72 (!) 94/69  119/76   Pulse: 84 81 81 76   Resp: 16 16 16 18   Temp: 36.8 °C (98.3 °F) 36.2 °C (97.2 °F)  36 °C (96.8 °F)   TempSrc: Temporal Temporal  Temporal   SpO2: 94% 97% 97% 96%   Weight: 76 kg (167 lb 8.8 oz)   70.8 kg (156 lb 1.4 oz)     Oxygen Therapy:  Pulse Oximetry: 96 %, O2 (LPM): 6, O2 Delivery: Oxymask    Constitutional:   Well developed, Well nourished, No acute distress  HENMT:  Normocephalic, Atraumatic, Oropharynx moist mucous membranes, No oral exudates, Nose normal.  No thyromegaly.  Eyes:  EOMI, Conjunctiva normal, No discharge.  Neck:  Normal range of motion, No cervical tenderness,  no JVD.  Cardiovascular:  Normal heart rate, Normal rhythm, No murmurs, No rubs, No gallops.   Extremitites with intact distal pulses, no cyanosis, or edema.  Lungs:  Normal breath sounds, breath sounds clear to auscultation  bilaterally,  no rales, no rhonchi, no wheezing.   Abdomen: Bowel sounds normal, Soft, No tenderness, No guarding, No rebound, No masses, No hepatosplenomegaly.  Skin: Warm, Dry, No erythema, No rash, no induration.  Neurologic: Alert & oriented x 3, No focal deficits noted, cranial nerves II through X are intact.  Psychiatric: Affect normal, Judgment normal, Mood normal.      MDM (Data Review):     Records reviewed and summarized in current documentation    Lab Data Review:  Recent Results (from the past 24 hour(s))   POC BREATHALIZER    Collection Time: 19  9:39 AM   Result Value Ref Range    POC Breathalizer 0.097 (A) 0.00 - 0.01 Percent   D-DIMER    Collection Time: 19 10:15 AM   Result Value Ref Range    D-Dimer Screen 0.86 (H) 0.00 - 0.50 ug/mL (FEU)   HCG QUAL SERUM    Collection Time: 19 10:15 AM   Result Value Ref Range    Beta-Hcg Qualitative Serum Negative Negative   POC BREATHALIZER    Collection Time: 19 10:19 AM   Result Value Ref Range    POC Breathalizer 0.08 (A) 0.00 - 0.01 Percent   LACTIC ACID    Collection Time: 19  1:28 PM   Result Value Ref Range    Lactic Acid 1.6 0.5 - 2.0 mmol/L   Procalcitonin    Collection Time: 19  1:28 PM   Result Value Ref Range    Procalcitonin <0.05 <0.25 ng/mL   MAGNESIUM    Collection Time: 19  1:28 PM   Result Value Ref Range    Magnesium 1.7 1.5 - 2.5 mg/dL   EKG    Collection Time: 19  5:37 PM   Result Value Ref Range    Report       [facility]    Test Date:  2019  Pt Name:    FRANK SEGUNDO               Department: 183  MRN:        [id number]                      Room:       T823  Gender:     Female                       Technician: [facility]  :        1990                   Requested By:NEHEMIAS RAO  Order #:    [id number]                    Vamsi MD:    Measurements  Intervals                                Axis  Rate:       73                           P:          52  MN:         156                           QRS:        61  QRSD:       94                           T:          46  QT:         404  QTc:        446    Interpretive Statements  SINUS RHYTHM  No previous ECG available for comparison     Comp Metabolic Panel    Collection Time: 06/02/19  7:12 PM   Result Value Ref Range    Sodium 137 135 - 145 mmol/L    Potassium 3.8 3.6 - 5.5 mmol/L    Chloride 103 96 - 112 mmol/L    Co2 26 20 - 33 mmol/L    Anion Gap 8.0 0.0 - 11.9    Glucose 122 (H) 65 - 99 mg/dL    Bun 16 8 - 22 mg/dL    Creatinine 0.74 0.50 - 1.40 mg/dL    Calcium 8.7 8.5 - 10.5 mg/dL    AST(SGOT) 62 (H) 12 - 45 U/L    ALT(SGPT) 125 (H) 2 - 50 U/L    Alkaline Phosphatase 70 30 - 99 U/L    Total Bilirubin 1.3 0.1 - 1.5 mg/dL    Albumin 3.7 3.2 - 4.9 g/dL    Total Protein 6.1 6.0 - 8.2 g/dL    Globulin 2.4 1.9 - 3.5 g/dL    A-G Ratio 1.5 g/dL   TSH    Collection Time: 06/02/19  7:12 PM   Result Value Ref Range    TSH 0.450 0.380 - 5.330 uIU/mL   FREE THYROXINE    Collection Time: 06/02/19  7:12 PM   Result Value Ref Range    Free T-4 0.78 0.53 - 1.43 ng/dL   ESTIMATED GFR    Collection Time: 06/02/19  7:12 PM   Result Value Ref Range    GFR If African American >60 >60 mL/min/1.73 m 2    GFR If Non African American >60 >60 mL/min/1.73 m 2       Imaging/Procedures Review:    Chest Xray:  Reviewed    EKG:   As in HPI.     MDM (Assessment and Plan):     Active Hospital Problems    Diagnosis   • Alcohol intoxication (HCC) [F10.929]     Priority: High   • Aspiration pneumonia (HCC) [J69.0]     Priority: High   • Second degree AV block, Mobitz type II [I44.1]     Priority: High   • Self-mutilation [Z72.89]   • Tachycardia [R00.0]   • Tobacco abuse [Z72.0]   • PTSD (post-traumatic stress disorder) [F43.10]         At this time, I am not certain that there is any exogenous influence that could be the cause of her AV heart block.  Certainly, there is strong suspicion due to her history of alcohol abuse along with suicidal attempts.    I also did  literature search and there has been case report on valproic acid causing AV conduction disease.  Therefore, I would recommend to stop valproic acid at this point time.  However, I would check with neurology before doing that.    In the meantime, we will obtain urine toxicology.    Overall, I am not certain that she is indicated to undergo permanent pacemaker at this time.  We will continue to monitor.  Patient has been asymptomatic.      Thank you for referring this patient to our cardiology service.  We will follow patient with you.      Quinn Alan MD.   Cardiology Inpatient Service.  [facility].  [phone].  [place], [place].

## 2019-06-03 NOTE — ASSESSMENT & PLAN NOTE
Possibly secondary to intoxication, the patient replaced on empiric antibiotics, close monitoring  Wean oxygen

## 2019-06-03 NOTE — PROGRESS NOTES
Assumed care from Junior SALCEDO. Received bedside report.  Updated patient on daily plan of care on white board. Patient denies any additional needs at this time.  Patient belongings and call light with in reach.  Vitals stable. Will continue to monitor.

## 2019-06-03 NOTE — ASSESSMENT & PLAN NOTE
With cutting of bilateral upper extremities  The patient was seen by life skills in the emergency room and is released for community treatment for her PTSD

## 2019-06-03 NOTE — DISCHARGE SUMMARY
"Discharge Summary    CHIEF COMPLAINT ON ADMISSION  Chief Complaint   Patient presents with   • Suicidal Ideation     pt adria rolle from home, sent text message to friend telling her \"im over living\". arrived w/mulitple superficial laceration in bilateral upper extremity and old/healed scar in upper and lower extremities.   • Alcohol Intoxication       Reason for Admission  ems     Admission Date  6/1/2019    CODE STATUS  Full Code    HPI & HOSPITAL COURSE  This is a 28 y.o. female here with a past medical history of migraines, PTSD, alcoholism, self medialization from cutting comes into the hospital while being intoxicated with alcohol.  She was found to be hypotensive and rest of her vital signs are within normal limits.  She was found to be hypoxic and short of breath.  She is placed on oxygen and chest x-ray did not find any infiltrates.  She had bilateral rhonchi.  CT of the chest was done that found secretions in the bilateral lower lobe likely secondary to aspiration.  No evidence of pulmonary embolism.  She was started on IV fluids and Unasyn.  The patient started developing type II heart block.  Cardiology was consulted and stated that there is no indication for pacemaker.  They recommended stopping her valproic acid.  I asked the patient about her valproic acid and she states that she does not take at home.       Therefore, she is discharged in good and stable condition to home with close outpatient follow-up.    The patient recovered much more quickly than anticipated on admission.    Discharge Date  6/3/301    FOLLOW UP ITEMS POST DISCHARGE  Follow up with cardiology    DISCHARGE DIAGNOSES  Active Problems:    Alcohol intoxication (HCC) POA: Unknown    Aspiration pneumonia (HCC) POA: Unknown    Second degree AV block, Mobitz type II POA: Unknown    Self-mutilation POA: Unknown    Tachycardia POA: Unknown    Tobacco abuse POA: Unknown    PTSD (post-traumatic stress disorder) POA: Unknown  Resolved " Problems:    * No resolved hospital problems. *      FOLLOW UP  No future appointments.  [facility], Emergency Dept  [address] [facility] [address] [phone]    As needed, If symptoms worsen    Primary care doctor    Schedule an appointment as soon as possible for a visit       [address] [address]  [phone]          MEDICATIONS ON DISCHARGE     Medication List      START taking these medications      Instructions   albuterol 108 (90 Base) MCG/ACT Aers inhalation aerosol   Inhale 2 Puffs by mouth every 6 hours as needed for Shortness of Breath.  Dose:  2 Puff     amoxicillin-clavulanate 875-125 MG Tabs  Commonly known as:  AUGMENTIN   Take 1 Tab by mouth 2 times a day for 7 days.  Dose:  1 Tab     guaiFENesin  MG Tb12  Commonly known as:  MUCINEX   Take 1 Tab by mouth every 12 hours for 7 days.  Dose:  600 mg            Allergies  Allergies   Allergen Reactions   • Nkda [No Known Drug Allergy]        DIET  Orders Placed This Encounter   Procedures   • Diet Order Regular     Standing Status:   Standing     Number of Occurrences:   1     Order Specific Question:   Diet:     Answer:   Regular [1]       ACTIVITY  As tolerated.  Weight bearing as tolerated    CONSULTATIONS  Cardiology    PROCEDURES  None    LABORATORY  Lab Results   Component Value Date    SODIUM 136 06/03/2019    POTASSIUM 3.9 06/03/2019    CHLORIDE 101 06/03/2019    CO2 25 06/03/2019    GLUCOSE 158 (H) 06/03/2019    BUN 13 06/03/2019    CREATININE 0.74 06/03/2019    CREATININE 0.9 01/09/2005        Lab Results   Component Value Date    WBC 11.7 (H) 06/03/2019    HEMOGLOBIN 15.0 06/03/2019    HEMATOCRIT 43.8 06/03/2019    PLATELETCT 286 06/03/2019        Total time of the discharge process exceeds 40 minutes.

## 2019-06-03 NOTE — PROGRESS NOTES
Cardiology Progress Note:      Date & Time note created:    6/3/2019   7:33 AM     Referring MD:  Dr. Kendall    Patient ID:  Name:             Mary Anne Valenzuela   YOB: 1990  Age:                 28 y.o.  female   MRN:               [id number]                                                             Chief Complaint / Reason for consult:  Second-degree AV block on telemetry.    History of Present Illness:    This is a 28 years old women with alcohol intoxication, suicidal attempts, self-mutilation, presented to the hospital with alcohol intoxication and laceration on her left arm.  Cardiology is been consulted due to finding of second-degree heart block type II on telemetry monitoring.  Patient is completely asymptomatic.  No prior history of syncopal episodes or syncope.  Patient does not have lightheadedness.  No family history of sudden cardiac death.    Patient is a poor historian however.    No prior cardiac work-up.  No prior invasive cardiac procedure or surgery.  No childhood cardiac problems.    I personally interpreted her baseline EKG tracing which shows sinus rhythm without significant malignant arrhythmias or heart block.    I also personally interpreted the telemetry tracing which showed evidence of drop P wave consistent with Mobitz type II AV heart block.      Interval events  6/2/2019 - Consult placed  6/3/2019 -patient evaluated bedside from AAOX4, afebrile and in no acute distress.  Patient reports feeling about the same today and denies any shortness of breath, dizziness, orthostatic changes, chest pain, dyspnea or LOC      Review of Systems:      Constitutional: Denies fevers, Denies weight changes  Eyes: Denies changes in vision, no eye pain  Ears/Nose/Throat/Mouth: Denies nasal congestion or sore throat   Cardiovascular: non chest pain, no palpitations   Respiratory: no shortness of breath , Denies cough  Gastrointestinal/Hepatic: Denies abdominal pain, nausea,  vomiting, diarrhea, constipation or GI bleeding   Genitourinary: Denies dysuria or frequency  Musculoskeletal/Rheum: Denies  joint pain and swelling   Skin: Denies rash  Neurological: Denies headache, confusion, memory loss or focal weakness/parasthesias  Psychiatric: denies mood disorder   Endocrine: Leesa thyroid problems  Heme/Oncology/Lymph Nodes: Denies enlarged lymph nodes, denies brusing or known bleeding disorder  All other systems were reviewed and are negative (AMA/CMS criteria)                Past Medical History:   Past Medical History:   Diagnosis Date   • Bronchitis 2014   • Pain     pelvis    • Rh negative state in antepartum period 3/17/2014     Active Hospital Problems    Diagnosis   • Alcohol intoxication (HCC) [F10.929]     Priority: High   • Aspiration pneumonia (HCC) [J69.0]     Priority: High   • Second degree AV block, Mobitz type II [I44.1]     Priority: High   • Self-mutilation [Z72.89]   • Tachycardia [R00.0]   • Tobacco abuse [Z72.0]   • PTSD (post-traumatic stress disorder) [F43.10]       Past Surgical History:  Past Surgical History:   Procedure Laterality Date   • HYSTERECTOMY LAPAROSCOPY  1/21/2017    Procedure: HYSTERECTOMY LAPAROSCOPY TOTAL  ;  Surgeon: Santo Walsh M.D.;  Location: [facility];  Service:    • CYSTOSCOPY  1/21/2017    Procedure: CYSTOSCOPY;  Surgeon: Santo Walsh M.D.;  Location: [facility];  Service:    • PELVISCOPY Bilateral 3/14/2016    Procedure: PELVISCOPY with Bilateral Salpingectomy ;  Surgeon: Santo Walsh M.D.;  Location: SURGERY SAME DAY [facility];  Service:    • HYSTEROSCOPY THERMAL ABLATION N/A 3/14/2016    Procedure: HYSTEROSCOPY THERMAL endometrial ABLATION, Cervical biopsy ;  Surgeon: Santo Walsh M.D.;  Location: SURGERY SAME DAY [facility];  Service:    • LYSIS ADHESIONS GYN  3/14/2016    Procedure: LYSIS ADHESIONS GYN;  Surgeon: Santo Walsh M.D.;  Location: SURGERY SAME DAY [facility];  Service:     • APPENDECTOMY  @ age 6    no complications or reactions to anesthesia       Hospital Medications:    Current Facility-Administered Medications:   •  senna-docusate (PERICOLACE or SENOKOT S) 8.6-50 MG per tablet 2 Tab, 2 Tab, Oral, BID **AND** polyethylene glycol/lytes (MIRALAX) PACKET 1 Packet, 1 Packet, Oral, QDAY PRN **AND** magnesium hydroxide (MILK OF MAGNESIA) suspension 30 mL, 30 mL, Oral, QDAY PRN **AND** bisacodyl (DULCOLAX) suppository 10 mg, 10 mg, Rectal, QDAY PRN, Emeterio Ferrara M.D.  •  Respiratory Care per Protocol, , Nebulization, Continuous RT, Emeterio Ferrara M.D.  •  lactated ringers infusion, , Intravenous, Continuous, Emeterio Ferrara M.D., Last Rate: 83 mL/hr at 06/02/19 1751  •  enoxaparin (LOVENOX) inj 40 mg, 40 mg, Subcutaneous, DAILY, Emeterio Ferrara M.D.  •  acetaminophen (TYLENOL) tablet 650 mg, 650 mg, Oral, Q6HRS PRN, Emeterio Ferrara M.D.  •  Notify provider if pain remains uncontrolled, , , CONTINUOUS **AND** Use the numeric rating scale (NRS-11) on regular floors and Critical-Care Pain Observation Tool (CPOT) on ICUs/Trauma to assess pain, , , CONTINUOUS **AND** Pulse Ox (Oximetry), , , CONTINUOUS **AND** Pharmacy Consult Request ...Pain Management Review 1 Each, 1 Each, Other, PHARMACY TO DOSE **AND** If patient difficult to arouse and/or has respiratory depression, stop any opiates that are currently infusing and call a Rapid Response., , , CONTINUOUS **AND** oxyCODONE immediate-release (ROXICODONE) tablet 2.5 mg, 2.5 mg, Oral, Q3HRS PRN **AND** oxyCODONE immediate-release (ROXICODONE) tablet 5 mg, 5 mg, Oral, Q3HRS PRN **AND** morphine (pf) 4 mg/ml injection 2 mg, 2 mg, Intravenous, Q3HRS PRN, Emeterio Schmidhuber, M.D.  •  ampicillin/sulbactam (UNASYN) 3 g in  mL IVPB, 3 g, Intravenous, Q6HRS, Emeterio Ferrara M.D., Last Rate: 200 mL/hr at 06/03/19 0524, 3 g at 06/03/19 0524  •  ondansetron (ZOFRAN) syringe/vial injection 4 mg, 4 mg, Intravenous, Q4HRS  PRN, Emeterio Ferrara M.D.  •  ondansetron (ZOFRAN ODT) dispertab 4 mg, 4 mg, Oral, Q4HRS PRN, Emeterio Ferrara M.D.  •  promethazine (PHENERGAN) tablet 12.5-25 mg, 12.5-25 mg, Oral, Q4HRS PRN, Emeterio Ferrara M.D.  •  promethazine (PHENERGAN) suppository 12.5-25 mg, 12.5-25 mg, Rectal, Q4HRS PRN, Emeterio Ferrara M.D.  •  prochlorperazine (COMPAZINE) injection 5-10 mg, 5-10 mg, Intravenous, Q4HRS PRN, Emeterio Ferrara M.D.  •  LORazepam (ATIVAN) tablet 0.5 mg, 0.5 mg, Oral, Q4HRS PRN, Emeterio Ferrara M.D.  •  LORazepam (ATIVAN) tablet 1 mg, 1 mg, Oral, Q4HRS PRN **OR** LORazepam (ATIVAN) injection 0.5 mg, 0.5 mg, Intravenous, Q4HRS PRN, Emeterio Ferrara M.D.  •  LORazepam (ATIVAN) tablet 2 mg, 2 mg, Oral, Q2HRS PRN **OR** LORazepam (ATIVAN) injection 1 mg, 1 mg, Intravenous, Q2HRS PRN, Emeterio Ferrara M.D.  •  LORazepam (ATIVAN) tablet 3 mg, 3 mg, Oral, Q HOUR PRN **OR** LORazepam (ATIVAN) injection 1.5 mg, 1.5 mg, Intravenous, Q HOUR PRN, Emeterio Ferrara M.D.  •  LORazepam (ATIVAN) tablet 4 mg, 4 mg, Oral, Q15 MIN PRN **OR** LORazepam (ATIVAN) injection 2 mg, 2 mg, Intravenous, Q15 MIN PRN, Emeterio Ferrara M.D.  •  thiamine tablet 100 mg, 100 mg, Oral, DAILY, 100 mg at 06/03/19 0523 **AND** multivitamin (THERAGRAN) tablet 1 Tab, 1 Tab, Oral, DAILY, 1 Tab at 06/03/19 0523 **AND** folic acid (FOLVITE) tablet 1 mg, 1 mg, Oral, DAILY, Emeterio Ferrara M.D., 1 mg at 06/03/19 0524  •  guaiFENesin dextromethorphan (ROBITUSSIN DM) 100-10 MG/5ML syrup 10 mL, 10 mL, Oral, Q6HRS PRN, Emeterio Ferrara M.D.  •  valproic acid (DEPAKENE) capsule 250 mg, 250 mg, Oral, Q8HRS, Emeterio Ferrara M.D., 250 mg at 06/03/19 0523  •  gabapentin (NEURONTIN) capsule 100 mg, 100 mg, Oral, Q8HRS, Emeterio Ferrara M.D., 100 mg at 06/03/19 0524  •  methylPREDNISolone (SOLU-MEDROL) 40 MG injection 40 mg, 40 mg, Intravenous, Q8HRS, Emeterio Ferrara M.D., 40 mg at 06/03/19 0526  •   budesonide-formoterol (SYMBICORT) 160-4.5 MCG/ACT inhaler 2 Puff, 2 Puff, Inhalation, BID (RT), Emeterio Ferrara M.D., 2 Puff at 06/02/19 2230    Current Outpatient Medications:  No prescriptions prior to admission.       Medication Allergy:  Allergies   Allergen Reactions   • Nkda [No Known Drug Allergy]        Family History:  Family History   Problem Relation Age of Onset   • Hypertension Father        Social History:  Social History     Social History   • Marital status: Single     Spouse name: N/A   • Number of children: N/A   • Years of education: N/A     Occupational History   • Not on file.     Social History Main Topics   • Smoking status: Current Every Day Smoker     Packs/day: 2.00     Years: 9.00     Types: Cigarettes   • Smokeless tobacco: Never Used      Comment: 5-7 cigarettes per day    • Alcohol use Yes      Comment: 6 per week    • Drug use: No   • Sexual activity: Yes     Partners: Male     Birth control/ protection: Condom      Comment: No Birth Control     Other Topics Concern   • Not on file     Social History Narrative   • No narrative on file         Physical Exam:  Vitals/ General Appearance:   Weight/BMI: Body mass index is 24.45 kg/m².  /71   Pulse 81   Temp 36.2 °C (97.2 °F) (Temporal)   Resp 18   Wt 70.8 kg (156 lb 1.4 oz)   SpO2 93%   Vitals:    06/02/19 1857 06/02/19 2022 06/03/19 0024 06/03/19 0518   BP:  119/76 112/78 108/71   Pulse: 81 76 81 81   Resp: 16 18 18 18   Temp:  36 °C (96.8 °F) 36 °C (96.8 °F) 36.2 °C (97.2 °F)   TempSrc:  Temporal Temporal Temporal   SpO2: 97% 96% 95% 93%   Weight:  70.8 kg (156 lb 1.4 oz)       Oxygen Therapy:  Pulse Oximetry: 93 %, O2 (LPM): 5, O2 Delivery: Oxymask    Constitutional:   Well developed, Well nourished, No acute distress  HENMT:  Normocephalic, Atraumatic, Oropharynx moist mucous membranes, No oral exudates, Nose normal.  No thyromegaly.  Eyes:  EOMI, Conjunctiva normal, No discharge.  Neck:  Normal range of motion, No  cervical tenderness,  no JVD.  Cardiovascular:  Normal heart rate, Normal rhythm, No murmurs, No rubs, No gallops.   Extremitites with intact distal pulses, no cyanosis, or edema.  Lungs:  Normal breath sounds, breath sounds clear to auscultation bilaterally,  no rales, no rhonchi, no wheezing.   Abdomen: Bowel sounds normal, Soft, No tenderness, No guarding, No rebound, No masses, No hepatosplenomegaly.  Skin: Warm, Dry, No erythema, No rash, no induration.  Neurologic: Alert & oriented x 3, No focal deficits noted, cranial nerves II through X are intact.  Psychiatric: Affect normal, Judgment normal, Mood normal.      MDM (Data Review):     Records reviewed and summarized in current documentation    Lab Data Review:  Recent Results (from the past 24 hour(s))   POC BREATHALIZER    Collection Time: 19  9:39 AM   Result Value Ref Range    POC Breathalizer 0.097 (A) 0.00 - 0.01 Percent   D-DIMER    Collection Time: 19 10:15 AM   Result Value Ref Range    D-Dimer Screen 0.86 (H) 0.00 - 0.50 ug/mL (FEU)   HCG QUAL SERUM    Collection Time: 19 10:15 AM   Result Value Ref Range    Beta-Hcg Qualitative Serum Negative Negative   POC BREATHALIZER    Collection Time: 19 10:19 AM   Result Value Ref Range    POC Breathalizer 0.08 (A) 0.00 - 0.01 Percent   LACTIC ACID    Collection Time: 19  1:28 PM   Result Value Ref Range    Lactic Acid 1.6 0.5 - 2.0 mmol/L   Procalcitonin    Collection Time: 19  1:28 PM   Result Value Ref Range    Procalcitonin <0.05 <0.25 ng/mL   MAGNESIUM    Collection Time: 19  1:28 PM   Result Value Ref Range    Magnesium 1.7 1.5 - 2.5 mg/dL   EKG    Collection Time: 19  5:37 PM   Result Value Ref Range    Report       [facility]    Test Date:  2019  Pt Name:    FRANK SEGUNDO               Department: 183  MRN:        [id number]                      Room:       T823  Gender:     Female                       Technician: CFR  :        1990                    Requested By:NEHEMIAS RAO  Order #:    [id number]                    Reading MD: Quinn Alan MD    Measurements  Intervals                                Axis  Rate:       73                           P:          52  MN:         156                          QRS:        61  QRSD:       94                           T:          46  QT:         404  QTc:        446    Interpretive Statements  SINUS RHYTHM  No previous ECG available for comparison    Electronically Signed On 6-3-2019 0:55:08 PDT by Quinn Alan MD     Comp Metabolic Panel    Collection Time: 06/02/19  7:12 PM   Result Value Ref Range    Sodium 137 135 - 145 mmol/L    Potassium 3.8 3.6 - 5.5 mmol/L    Chloride 103 96 - 112 mmol/L    Co2 26 20 - 33 mmol/L    Anion Gap 8.0 0.0 - 11.9    Glucose 122 (H) 65 - 99 mg/dL    Bun 16 8 - 22 mg/dL    Creatinine 0.74 0.50 - 1.40 mg/dL    Calcium 8.7 8.5 - 10.5 mg/dL    AST(SGOT) 62 (H) 12 - 45 U/L    ALT(SGPT) 125 (H) 2 - 50 U/L    Alkaline Phosphatase 70 30 - 99 U/L    Total Bilirubin 1.3 0.1 - 1.5 mg/dL    Albumin 3.7 3.2 - 4.9 g/dL    Total Protein 6.1 6.0 - 8.2 g/dL    Globulin 2.4 1.9 - 3.5 g/dL    A-G Ratio 1.5 g/dL   TSH    Collection Time: 06/02/19  7:12 PM   Result Value Ref Range    TSH 0.450 0.380 - 5.330 uIU/mL   FREE THYROXINE    Collection Time: 06/02/19  7:12 PM   Result Value Ref Range    Free T-4 0.78 0.53 - 1.43 ng/dL   ESTIMATED GFR    Collection Time: 06/02/19  7:12 PM   Result Value Ref Range    GFR If African American >60 >60 mL/min/1.73 m 2    GFR If Non African American >60 >60 mL/min/1.73 m 2   CBC with Differential    Collection Time: 06/03/19 12:44 AM   Result Value Ref Range    WBC 11.7 (H) 4.8 - 10.8 K/uL    RBC 4.68 4.20 - 5.40 M/uL    Hemoglobin 15.0 12.0 - 16.0 g/dL    Hematocrit 43.8 37.0 - 47.0 %    MCV 93.6 81.4 - 97.8 fL    MCH 32.1 27.0 - 33.0 pg    MCHC 34.2 33.6 - 35.0 g/dL    RDW 41.4 35.9 - 50.0 fL    Platelet Count 286 164 - 446 K/uL    MPV  9.3 9.0 - 12.9 fL    Neutrophils-Polys 90.70 (H) 44.00 - 72.00 %    Lymphocytes 7.10 (L) 22.00 - 41.00 %    Monocytes 1.20 0.00 - 13.40 %    Eosinophils 0.00 0.00 - 6.90 %    Basophils 0.40 0.00 - 1.80 %    Immature Granulocytes 0.60 0.00 - 0.90 %    Nucleated RBC 0.00 /100 WBC    Neutrophils (Absolute) 10.62 (H) 2.00 - 7.15 K/uL    Lymphs (Absolute) 0.83 (L) 1.00 - 4.80 K/uL    Monos (Absolute) 0.14 0.00 - 0.85 K/uL    Eos (Absolute) 0.00 0.00 - 0.51 K/uL    Baso (Absolute) 0.05 0.00 - 0.12 K/uL    Immature Granulocytes (abs) 0.07 0.00 - 0.11 K/uL    NRBC (Absolute) 0.00 K/uL   Comp Metabolic Panel (CMP)    Collection Time: 06/03/19 12:44 AM   Result Value Ref Range    Sodium 136 135 - 145 mmol/L    Potassium 3.9 3.6 - 5.5 mmol/L    Chloride 101 96 - 112 mmol/L    Co2 25 20 - 33 mmol/L    Anion Gap 10.0 0.0 - 11.9    Glucose 158 (H) 65 - 99 mg/dL    Bun 13 8 - 22 mg/dL    Creatinine 0.74 0.50 - 1.40 mg/dL    Calcium 9.0 8.5 - 10.5 mg/dL    AST(SGOT) 59 (H) 12 - 45 U/L    ALT(SGPT) 126 (H) 2 - 50 U/L    Alkaline Phosphatase 76 30 - 99 U/L    Total Bilirubin 1.3 0.1 - 1.5 mg/dL    Albumin 4.0 3.2 - 4.9 g/dL    Total Protein 6.5 6.0 - 8.2 g/dL    Globulin 2.5 1.9 - 3.5 g/dL    A-G Ratio 1.6 g/dL   Magnesium    Collection Time: 06/03/19 12:44 AM   Result Value Ref Range    Magnesium 2.0 1.5 - 2.5 mg/dL   Phosphorus    Collection Time: 06/03/19 12:44 AM   Result Value Ref Range    Phosphorus 2.6 2.5 - 4.5 mg/dL   ESTIMATED GFR    Collection Time: 06/03/19 12:44 AM   Result Value Ref Range    GFR If African American >60 >60 mL/min/1.73 m 2    GFR If Non African American >60 >60 mL/min/1.73 m 2       Imaging/Procedures Review:    Chest Xray:  Reviewed    EKG:   As in HPI.     MDM (Assessment and Plan):     Active Hospital Problems    Diagnosis   • Alcohol intoxication (HCC) [F10.929]     Priority: High   • Aspiration pneumonia (HCC) [J69.0]     Priority: High   • Second degree AV block, Mobitz type II [I44.1]      Priority: High   • Self-mutilation [Z72.89]   • Tachycardia [R00.0]   • Tobacco abuse [Z72.0]   • PTSD (post-traumatic stress disorder) [F43.10]       Assessment/plan  #AV heart block  Heart block on rhythm strips  Asymptomatic patient in no acute distress  No electrolyte abnormality  Normal echocardiogram  No need for PPM at current moment    Thank you for the cardiology consult, cardiology will sign off

## 2019-06-03 NOTE — PROGRESS NOTES
Patient transferred from [place] [address] to [facility] via gurney.  Tele box on, monitor room notified.  Monitor rhythm 83.  Vital signs stable, patient updated on plan of care.  All safety measures in place.  Bed alarm in place, working appropriately.  Bedside report received.

## 2019-06-03 NOTE — ASSESSMENT & PLAN NOTE
Incidental finding while on telemetry, cardiology consultation  Echocardiogram  Balance electrolytes, additional magnesium, check thyroid function

## 2019-06-07 LAB
BACTERIA BLD CULT: NORMAL
BACTERIA BLD CULT: NORMAL
SIGNIFICANT IND 70042: NORMAL
SIGNIFICANT IND 70042: NORMAL
SITE SITE: NORMAL
SITE SITE: NORMAL
SOURCE SOURCE: NORMAL
SOURCE SOURCE: NORMAL

## (undated) DEVICE — GLOVE BIOGEL SZ 6.5 SURGICAL PF LTX (50PR/BX 4BX/CA)

## (undated) DEVICE — NEEDLE INSUFFLATION FOR STEP - (12/BX)

## (undated) DEVICE — PACK LAPAROSCOPY - (1/CA)

## (undated) DEVICE — WATER IRRIG. STER 3000 ML - (4/CA)

## (undated) DEVICE — TUBE E-T HI-LO CUFF 7.0MM (10EA/PK)

## (undated) DEVICE — SUTURE GENERAL

## (undated) DEVICE — APPLICATOR COTTON TIP 6 IN - STERILE (2EA/PK 100PK/BX)

## (undated) DEVICE — SET IRRIGATION CYSTOSCOPY TUBE L80 IN (20EA/CA)

## (undated) DEVICE — SUTURE 0 VICRYL PLUS CT-1 - 36 INCH (36/BX)

## (undated) DEVICE — TUBING CLEARLINK DUO-VENT - C-FLO (48EA/CA)

## (undated) DEVICE — SUTURE 2-0 VICRYL PLUS CT-1 36 (36PK/BX)"

## (undated) DEVICE — DETERGENT RENUZYME PLUS 10 OZ PACKET (50/BX)

## (undated) DEVICE — SYRINGE 12 CC LUER TIP - (80/BX) OBSOLETE ITEM

## (undated) DEVICE — TUBE CONNECT SUCTION CLEAR 120 X 1/4" (50EA/CA)"

## (undated) DEVICE — SUCTION INSTRUMENT YANKAUER BULBOUS TIP W/O VENT (50EA/CA)

## (undated) DEVICE — MASK ANESTHESIA ADULT  - (100/CA)

## (undated) DEVICE — GLOVE BIOGEL INDICATOR SZ 6.5 SURGICAL PF LTX - (50PR/BX 4BX/CA)

## (undated) DEVICE — KIT ANESTHESIA W/CIRCUIT & 3/LT BAG W/FILTER (20EA/CA)

## (undated) DEVICE — ELECTRODE 850 FOAM ADHESIVE - HYDROGEL RADIOTRNSPRNT (50/PK)

## (undated) DEVICE — ELECTRODE 5MM LHK LAPSCP STERILE DISP- MEGADYNE  (5/CA)

## (undated) DEVICE — CANISTER SUCTION 3000ML MECHANICAL FILTER AUTO SHUTOFF MEDI-VAC NONSTERILE LF DISP  (40EA/CA)

## (undated) DEVICE — HEAD HOLDER JUNIOR/ADULT

## (undated) DEVICE — SET LEADWIRE 5 LEAD BEDSIDE DISPOSABLE ECG (1SET OF 5/EA)

## (undated) DEVICE — LIGASURE VESSEL SEAL LAP 10MM - SINGLE USE (6EA/CA)

## (undated) DEVICE — SPONGE XRAY 8X4 STERL. 12PL - (10EA/TY 80TY/CA)

## (undated) DEVICE — SENSOR SPO2 NEO LNCS ADHESIVE (20/BX) SEE USER NOTES

## (undated) DEVICE — PROTECTOR ULNA NERVE - (36PR/CA)

## (undated) DEVICE — KIT ROOM DECONTAMINATION

## (undated) DEVICE — TRAY CATHETER FOLEY URINE METER W/STATLOCK 350ML (10EA/CA)

## (undated) DEVICE — DRAPESURG STERI-DRAPE LONG - (10/BX 4BX/CA)

## (undated) DEVICE — GOWN WARMING STANDARD FLEX - (30/CA)

## (undated) DEVICE — PENCIL ELECTSURG 10FT BTN SWH - (50/CA)

## (undated) DEVICE — TROCAR STEP 11MM - (3/CA)

## (undated) DEVICE — GLOVE SZ 7.5 BIOGEL PI MICRO - PF LF (50PR/BX)

## (undated) DEVICE — SUTURE 4-0 MONOCRYL PLUS PS-2 - 27 INCH (36/BX)

## (undated) DEVICE — GOWN SURGEONS X-LARGE - DISP. (30/CA)

## (undated) DEVICE — GLOVE BIOGEL SZ 7.5 SURGICAL PF LTX - (50PR/BX 4BX/CA)

## (undated) DEVICE — SET EXTENSION WITH 2 PORTS (48EA/CA) ***PART #2C8610 IS A SUBSTITUTE*****

## (undated) DEVICE — UTERINE MANIP V-CARE LARGE - (DAVINCI)  8/CA

## (undated) DEVICE — SET SUCTION/IRRIGATION WITH DISPOSABLE TIP (6/CA )PART #0250-070-520 IS A SUB

## (undated) DEVICE — GLOVE BIOGEL PI INDICATOR SZ 8.0 SURGICAL PF LF -(50/BX 4BX/CA)

## (undated) DEVICE — JELLY, KY 2 0Z STERILE

## (undated) DEVICE — LACTATED RINGERS INJ 1000 ML - (14EA/CA 60CA/PF)

## (undated) DEVICE — SHEAR CVD HARMONIC ACE 36CM - (6/BX) **REPLACED USE #11148 PART NUMBER HARH36***

## (undated) DEVICE — GLOVE BIOGEL SZ 8 SURGICAL PF LTX - (50PR/BX 4BX/CA)